# Patient Record
Sex: FEMALE | Race: WHITE | NOT HISPANIC OR LATINO | Employment: FULL TIME | URBAN - METROPOLITAN AREA
[De-identification: names, ages, dates, MRNs, and addresses within clinical notes are randomized per-mention and may not be internally consistent; named-entity substitution may affect disease eponyms.]

---

## 2017-12-01 ENCOUNTER — GENERIC CONVERSION - ENCOUNTER (OUTPATIENT)
Dept: OTHER | Facility: OTHER | Age: 42
End: 2017-12-01

## 2017-12-01 ENCOUNTER — ALLSCRIPTS OFFICE VISIT (OUTPATIENT)
Dept: OTHER | Facility: OTHER | Age: 42
End: 2017-12-01

## 2017-12-04 ENCOUNTER — GENERIC CONVERSION - ENCOUNTER (OUTPATIENT)
Dept: FAMILY MEDICINE CLINIC | Facility: CLINIC | Age: 42
End: 2017-12-04

## 2017-12-05 ENCOUNTER — LAB CONVERSION - ENCOUNTER (OUTPATIENT)
Dept: OTHER | Facility: OTHER | Age: 42
End: 2017-12-05

## 2017-12-05 LAB
ADDITIONAL INFORMATION (HISTORICAL): NORMAL
ADEQUACY: (HISTORICAL): NORMAL
COMMENT (HISTORICAL): NORMAL
CYTOTECHNOLOGIST: (HISTORICAL): NORMAL
INTERPRETATION (HISTORICAL): NORMAL
LMP (HISTORICAL): NORMAL
PREV. BX: (HISTORICAL): NORMAL
PREV. PAP (HISTORICAL): NORMAL
SOURCE (HISTORICAL): NORMAL

## 2018-01-22 VITALS
RESPIRATION RATE: 12 BRPM | BODY MASS INDEX: 25.39 KG/M2 | WEIGHT: 158 LBS | HEART RATE: 80 BPM | DIASTOLIC BLOOD PRESSURE: 80 MMHG | SYSTOLIC BLOOD PRESSURE: 124 MMHG | HEIGHT: 66 IN

## 2018-01-23 VITALS
SYSTOLIC BLOOD PRESSURE: 124 MMHG | RESPIRATION RATE: 12 BRPM | BODY MASS INDEX: 25.39 KG/M2 | DIASTOLIC BLOOD PRESSURE: 80 MMHG | HEART RATE: 80 BPM | WEIGHT: 158 LBS | HEIGHT: 66 IN

## 2018-01-23 NOTE — PROGRESS NOTES
Assessment    1  History of onychomycosis (V12 09) (Z86 19)   2  Breast cancer (174 9) (C50 919)   3  Pigmented skin lesions (709 00) (L81 9)   4  Encounter for annual routine gynecological examination (V72 31) (Z01 419)   5  Onychomycosis (110 1) (B35 1)    Plan  Encounter for routine gynecological examination    · (1) THIN PREP PAP WITH IMAGING; Status: In Progress - Specimen/Data Collected;    Done: [id number]  Maturation index required? : No  HPV? : if ASCUS  PMH: History of onychomycosis    · Escitalopram Oxalate 5 MG Oral Tablet; Take 1 tablet daily   · Terbinafine HCl - 250 MG Oral Tablet (LamISIL); TAKE 1 TABLET DAILY AS  DIRECTED   · (Q) HEPATIC FUNCT PANEL W/O TP; Status:Active; Requested for:[id number];    · (Q) HEPATIC FUNCT PANEL W/O TP; Status:Active; Requested TOBIN:[id number];         A/P  1/ routine gyn: we have done your pap   we will put a card in the mail with the results and you will be due back in 2 years    2  Multiple nevi: we have referred you to Dr Deya Olvera for surveillance    3  Breast CA: you are following with Dr Shawanda Pierre     4  fungal nails we will treat this with lamisil but please get your  blood work to check your liver first  we will call you with the results  then you will treat for 3 months with labs at 6 weeks to sally this  5   PMS: we have started you on lexapro    very low dose   take this once a day  please let me know if this is helping  '    we have updated your tdap today   rto prn     Chief Complaint  pt  presents in the office today due to being a new pt  and for a pap    rosalina - def  pap - 08/2016       History of Present Illness  HM, Adult Female: The patient is being seen for a gynecology evaluation  General Health:   Reproductive health:  she reports abnormal menses  Menstrual history:  age at menarche was 17  LMP: it was of a normal amount and duration and the duration was normal  Recent menstrual periods: bleeding has been normal  The cycles have been regular  The duration of her recent periods has been regular  Screening:   HPI: Here today a s a new pt for gyn   Has history of breast CA : first in 2002 and again in 2012  B/L mastectomy and breast reconstruction  Follows with Dr Raiford Shone and Tj Thorpe  IS due for follow up and will schedule those appts  has concern regarding fungal toe nails and does desire treatment  got biometric screenings done at work a couple months ago and these were normal  does complain of PMS and desires treatment ( gets emotional and sood with her cycles)         Review of Systems    Constitutional: No fever, no chills, feels well, no tiredness, no recent weight gain or weight loss  Cardiovascular: No complaints of slow heart rate, no fast heart rate, no chest pain, no palpitations, no leg claudication, no lower extremity edema  Respiratory: No complaints of shortness of breath, no wheezing, no cough, no SOB on exertion, no orthopnea, no PND  Gastrointestinal: No complaints of abdominal pain, no constipation, no nausea or vomiting, no diarrhea, no bloody stools  Genitourinary: as noted in HPI  Musculoskeletal: No complaints of arthralgias, no myalgias, no joint swelling or stiffness, no limb pain or swelling  Integumentary: No complaints of skin rash or lesions, no itching, no skin wounds, no breast pain or lump  Neurological: No complaints of headache, no confusion, no convulsions, no numbness, no dizziness or fainting, no tingling, no limb weakness, no difficulty walking  Psychiatric: as noted in HPI  Endocrine: No complaints of proptosis, no hot flashes, no muscle weakness, no deepening of the voice, no feelings of weakness  Hematologic/Lymphatic: No complaints of swollen glands, no swollen glands in the neck, does not bleed easily, does not bruise easily  Active Problems    1   Breast cancer (174 9) (I14 651)    Past Medical History    · History of Encounter for routine gynecological examination (D75 95) (Z01 419)   · History of onychomycosis (V12 09) (Z86 19)    Surgical History    · History of Breast Surgery Lumpectomy    Family History  Mother    · Family history of   Father    · Family history of transitional cell carcinoma of bladder (V16 52) (Z80 52)    Social History    · Always uses seat belt   · Has smoke detectors   ·    · Never a smoker   · No illicit drug use   · Occasional alcohol use    Current Meds   1  Multi-Vitamin TABS; Therapy: (Recorded:10Yqa6329) to Recorded    Allergies    1  Penicillins    Vitals   Recorded: 36SZF9296 09:03AM   Heart Rate 80   Respiration 12   Systolic 507   Diastolic 80   Height 5 ft 6 in   Weight 158 lb    BMI Calculated 25 5   BSA Calculated 1 81     Physical Exam    Constitutional   General appearance: No acute distress, well appearing and well nourished  Neck   Neck: Supple, symmetric, trachea midline, no masses  Pulmonary   Auscultation of lungs: Clear to auscultation  Cardiovascular   Auscultation of heart: Normal rate and rhythm, normal S1 and S2, no murmurs  Chest   Breasts: Normal, no dimpling or skin changes appreciated  Palpation of breasts and axillae: Normal, no masses palpated  Abdomen   Abdomen: Non-tender, no masses  Liver and spleen: No hepatomegaly or splenomegaly  Genitourinary   External genitalia and vagina: Normal, no lesions appreciated  Cervix: Normal, no lesions, Pap obtained  Examination of the cervix revealed normal findings  A Pap smear was performed  Uterus: Normal size, no tenderness, no masses  Adnexa/Parametria: Normal, no masses or tenderness  Lymphatic   Palpation of lymph nodes in neck: No lymphadenopathy  Musculoskeletal   Gait and station: Normal     Range of motion: Normal     Skin   Skin and subcutaneous tissue: Abnormal   multiple deeply pigment nevi varing in size from 2 mm to 8 mm on all body surfaces except face  R foot great toe with thickened mycotic nails     Psychiatric Orientation to person, place, and time: Normal   good eye contact and insight  good perspective and judgement  Mood and affect: Normal        Results/Data  PHQ-2 Adult Depression Screening [id number] 10:07AM User, Ahs     Test Name Result Flag Reference   PHQ-2 Adult Depression Score 0     Over the last two weeks, how often have you been bothered by any of the following problems?   Little interest or pleasure in doing things: Not at all - 0  Feeling down, depressed, or hopeless: Not at all - 0   PHQ-2 Adult Depression Screening Negative         Signatures   Electronically signed by : YONI Beckett; Dec  1 2017 10:46AM EST                       (Author)    Electronically signed by : Pato Tamayo DO; Dec  1 2017 12:50PM EST

## 2018-05-20 DIAGNOSIS — F32.A DEPRESSION, UNSPECIFIED DEPRESSION TYPE: Primary | ICD-10-CM

## 2018-05-21 RX ORDER — FLUOXETINE 10 MG/1
CAPSULE ORAL
Qty: 30 CAPSULE | Refills: 3 | Status: SHIPPED | OUTPATIENT
Start: 2018-05-21 | End: 2018-09-30 | Stop reason: SDUPTHER

## 2018-09-30 DIAGNOSIS — F32.A DEPRESSION, UNSPECIFIED DEPRESSION TYPE: ICD-10-CM

## 2018-10-01 RX ORDER — FLUOXETINE 10 MG/1
CAPSULE ORAL
Qty: 30 CAPSULE | Refills: 3 | Status: SHIPPED | OUTPATIENT
Start: 2018-10-01 | End: 2019-01-29 | Stop reason: SDUPTHER

## 2018-12-03 ENCOUNTER — OFFICE VISIT (OUTPATIENT)
Dept: FAMILY MEDICINE CLINIC | Facility: CLINIC | Age: 43
End: 2018-12-03
Payer: COMMERCIAL

## 2018-12-03 VITALS
HEIGHT: 65 IN | BODY MASS INDEX: 26.66 KG/M2 | DIASTOLIC BLOOD PRESSURE: 80 MMHG | WEIGHT: 160 LBS | SYSTOLIC BLOOD PRESSURE: 120 MMHG | HEART RATE: 70 BPM | RESPIRATION RATE: 12 BRPM

## 2018-12-03 DIAGNOSIS — Z13.220 SCREENING, LIPID: ICD-10-CM

## 2018-12-03 DIAGNOSIS — Z13.0 SCREENING, ANEMIA, DEFICIENCY, IRON: ICD-10-CM

## 2018-12-03 DIAGNOSIS — N92.4 EXCESSIVE BLEEDING IN PREMENOPAUSAL PERIOD: ICD-10-CM

## 2018-12-03 DIAGNOSIS — Z13.228 SCREENING FOR METABOLIC DISORDER: ICD-10-CM

## 2018-12-03 DIAGNOSIS — Z13.29 SCREENING FOR THYROID DISORDER: ICD-10-CM

## 2018-12-03 DIAGNOSIS — Z01.419 ENCOUNTER FOR ROUTINE GYNECOLOGICAL EXAMINATION WITH PAPANICOLAOU SMEAR OF CERVIX: Primary | ICD-10-CM

## 2018-12-03 PROBLEM — C50.919 BREAST CANCER (HCC): Status: ACTIVE | Noted: 2017-12-01

## 2018-12-03 PROCEDURE — 99396 PREV VISIT EST AGE 40-64: CPT | Performed by: NURSE PRACTITIONER

## 2018-12-03 RX ORDER — MULTIVITAMIN
1 TABLET ORAL DAILY
COMMUNITY

## 2018-12-03 NOTE — PROGRESS NOTES
Subjective    Glenna Patel is a 41 y o  female here for a routine gynecologic exam       Current complaints:   Heavy periods'      Gynecologic History  Patient's last menstrual period was 11/08/2018  Contraception: none  Last Pap:2016  Results were: normal  Last mammogram:defers due to B/L mastectomy      Obstetric History  OB History        The following portions of the patient's history were reviewed and updated as appropriate: allergies, current medications, past family history, past medical history, past social history, past surgical history and problem list     Review of Systems   Constitutional: Negative  Respiratory: Negative  Cardiovascular: Negative  Genitourinary: Positive for menstrual problem  Musculoskeletal: Negative  Skin: Negative  Neurological: Negative  Psychiatric/Behavioral: Negative  Objective    Vitals:    12/03/18 0732   BP: 120/80   BP Location: Left arm   Patient Position: Sitting   Pulse: 70   Resp: 12   Weight: 72 6 kg (160 lb)   Height: 5' 5" (1 651 m)       Physical Exam   Constitutional: She is oriented to person, place, and time  She appears well-developed and well-nourished  HENT:   Head: Normocephalic  Eyes: Pupils are equal, round, and reactive to light  Neck: Normal range of motion  Neck supple  Cardiovascular: Normal rate and regular rhythm  Pulmonary/Chest: Effort normal and breath sounds normal    Abdominal: Soft  Bowel sounds are normal    Genitourinary: Vagina normal  There is no rash, tenderness or lesion on the right labia  There is no rash, tenderness or lesion on the left labia  Cervix exhibits no discharge and no friability  Right adnexum displays no mass, no tenderness and no fullness  Left adnexum displays no mass, no tenderness and no fullness  Genitourinary Comments: Uterus was enlarged , firm on bimanual exam    Pap obtained  Musculoskeletal: Normal range of motion     Neurological: She is alert and oriented to person, place, and time  Skin: Skin is warm  Psychiatric: She has a normal mood and affect  Her behavior is normal  Judgment and thought content normal        Assessment     Healthy female exam       Plan  1  Menorrhagia  : we will obtain an ultrasound of the pelvis and we will call you with the results  We have also ordered labs and we will be in touch with those results as well  2  Pap was obtained and we will put a  Card it the mail with the results       rto prn

## 2018-12-05 LAB
CLINICAL INFO: NORMAL
DATE PREVIOUS BX: NORMAL
LMP START DATE: NORMAL
SL AMB PREV. PAP:: NORMAL
SPECIMEN SOURCE CVX/VAG CYTO: NORMAL

## 2018-12-06 ENCOUNTER — OFFICE VISIT (OUTPATIENT)
Dept: HEMATOLOGY ONCOLOGY | Facility: CLINIC | Age: 43
End: 2018-12-06
Payer: COMMERCIAL

## 2018-12-06 VITALS
RESPIRATION RATE: 16 BRPM | BODY MASS INDEX: 26.66 KG/M2 | SYSTOLIC BLOOD PRESSURE: 128 MMHG | HEIGHT: 65 IN | TEMPERATURE: 98.6 F | OXYGEN SATURATION: 97 % | HEART RATE: 96 BPM | WEIGHT: 160 LBS | DIASTOLIC BLOOD PRESSURE: 90 MMHG

## 2018-12-06 DIAGNOSIS — C50.912 MALIGNANT NEOPLASM OF BOTH BREASTS IN FEMALE, ESTROGEN RECEPTOR NEGATIVE, UNSPECIFIED SITE OF BREAST (HCC): Primary | ICD-10-CM

## 2018-12-06 DIAGNOSIS — Z15.01 BRCA1 GENE MUTATION POSITIVE: ICD-10-CM

## 2018-12-06 DIAGNOSIS — C50.911 MALIGNANT NEOPLASM OF BOTH BREASTS IN FEMALE, ESTROGEN RECEPTOR NEGATIVE, UNSPECIFIED SITE OF BREAST (HCC): Primary | ICD-10-CM

## 2018-12-06 DIAGNOSIS — Z15.09 BRCA1 GENE MUTATION POSITIVE: ICD-10-CM

## 2018-12-06 DIAGNOSIS — Z17.1 MALIGNANT NEOPLASM OF BOTH BREASTS IN FEMALE, ESTROGEN RECEPTOR NEGATIVE, UNSPECIFIED SITE OF BREAST (HCC): Primary | ICD-10-CM

## 2018-12-06 PROCEDURE — 99204 OFFICE O/P NEW MOD 45 MIN: CPT | Performed by: INTERNAL MEDICINE

## 2018-12-06 NOTE — PROGRESS NOTES
Hematology / Oncology Outpatient Consult Note    Brittany Ybarra 41 y o  female [id number]         Date:  12/6/2018    Assessment / Plan:  A 79-year-old premenopausal woman who has history of stage IIB right breast cancer, triple negative disease diagnosed in 2003 as well as stage IA left breast cancer, grade 3, triple negative disease, diagnosed in 2010  She is status post bilateral mastectomy  She was treated with adjuvant chemotherapy further respected time of breast cancer  Clinically, she has no evidence recurrent disease  We discussed prophylactic oophorectomy since she has BRCA 1 mutation  She has no family history of breast or ovarian cancer  I recommended her to see our gynecologic oncologist to discuss procedure of bilateral oophorectomy  She is in agreement with my recommendations  I will see her again in a year for routine follow-up  Subjective:     HPI:  A 79-year-old premenopausal woman who was diagnosed with stage IIB right breast cancer, triple negative disease in 2003  She underwent lumpectomy followed by adjuvant chemotherapy with AC followed by paclitaxel  She was then diagnosed with stage IA left breast cancer, triple negative disease in 2010  She was found to have BRCA-1 mutation  Therefore, she underwent left mastectomy as well as right prophylactic mastectomy, resulting in [place] followed by adjuvant chemotherapy with Taxotere and cyclophosphamide x4 cycles  She is currently on observation  She was last seen by me in July 2011  She presents today for follow-up  She feels well  She has no complaint of pain  Her weight is stable  She has no respiratory symptoms  She has no significant past medical history  She has not had prophylactic oophorectomy yet  She has 6years old boy  She has no intention to have another child  Her performance status is normal         Interval History:          Objective:     Primary Diagnosis:    1     Right breast cancer, stage IIB (pT2, pN1, M0) ER IN negative, HER2 negative disease  Diagnosed in 2003  2    Left breast cancer, stage IA (pT1c, pN0, M0) grade 3, ERPR negative, HER2 negative disease  Diagnosed in June 2010  3  BRCA-1 mutation  Cancer Staging:  Cancer Staging  No matching staging information was found for the patient  Previous Hematologic/ Oncologic Treatment:     1  Adjuvant chemotherapy with AC followed by paclitaxel in 2003  2    Adjuvant chemotherapy with Taxotere and cyclophosphamide x4 cycle in 2010  Current Hematologic/ Oncologic Treatment:      Observation  Disease Status:     INDIA status post bilateral mastectomy  Test Results:    Pathology:        Radiology:        Laboratory:    See below  Physical Exam:      General Appearance:    Alert, oriented        Eyes:    PERRL   Ears:    Normal external ear canals, both ears   Nose:   Nares normal, septum midline   Throat:   Mucosa moist  Pharynx without injection  Neck:   Supple       Lungs:     Clear to auscultation bilaterally   Chest Wall:    No tenderness or deformity    Heart:    Regular rate and rhythm       Abdomen:     Soft, non-tender, bowel sounds +, no organomegaly           Extremities:   Extremities no cyanosis or edema       Skin:   no rash or icterus  Lymph nodes:   Cervical, supraclavicular, and axillary nodes normal   Neurologic:   CNII-XII intact, normal strength, sensation and reflexes     Throughout          Breast exam:   Status post bilateral mastectomy with reconstruction  No palpable abnormality in either reconstructed breast or chest wall  ROS: Review of Systems   All other systems reviewed and are negative  Imaging: No results found        Labs: No results found for: WBC, HGB, HCT, MCV, PLT  No results found for: NA, K, CL, CO2, ANIONGAP, BUN, CREATININE, GLUCOSE, GLUF, CALCIUM, CORRECTEDCA, AST, ALT, ALKPHOS, PROT, BILITOT, EGFR        Vital Sign:    Body surface area is 1 8 meters squared  Wt Readings from Last 3 Encounters:   12/06/18 72 6 kg (160 lb)   12/03/18 72 6 kg (160 lb)   12/01/17 71 7 kg (158 lb)        Temp Readings from Last 3 Encounters:   12/06/18 98 6 °F (37 °C) (Tympanic)        BP Readings from Last 3 Encounters:   12/06/18 128/90   12/03/18 120/80   12/01/17 124/80         Pulse Readings from Last 3 Encounters:   12/06/18 96   12/03/18 70   12/01/17 80     @LASTSAO2(3)@    Active Problems:   Patient Active Problem List   Diagnosis    Breast cancer ([facility] [address] )    BRCA1 gene mutation positive       Past Medical History: History reviewed  No pertinent past medical history  Surgical History:   Past Surgical History:   Procedure Laterality Date    BREAST SURGERY      Lumpectomy       Family History:    Family History   Problem Relation Age of Onset    Colon cancer Mother     Other Father        Cancer-related family history includes Colon cancer in her mother  Social History:   Social History     Social History    Marital status: /Civil Union     Spouse name: N/A    Number of children: N/A    Years of education: N/A     Occupational History    Not on file  Social History Main Topics    Smoking status: Never Smoker    Smokeless tobacco: Never Used    Alcohol use Yes      Comment: Occasional     Drug use: No    Sexual activity: Not on file     Other Topics Concern    Not on file     Social History Narrative    Always uses seat belt    Has smoke detectors           Current Medications:   Current Outpatient Prescriptions   Medication Sig Dispense Refill    FLUoxetine (PROzac) 10 mg capsule TAKE ONE CAPSULE EVERY DAY BEFORE NOON 30 capsule 3    Multiple Vitamin (MULTIVITAMIN) tablet Take 1 tablet by mouth daily       No current facility-administered medications for this visit  Allergies:    Allergies   Allergen Reactions    Penicillins Hives

## 2019-01-29 DIAGNOSIS — F32.A DEPRESSION, UNSPECIFIED DEPRESSION TYPE: ICD-10-CM

## 2019-01-30 RX ORDER — FLUOXETINE 10 MG/1
CAPSULE ORAL
Qty: 30 CAPSULE | Refills: 3 | Status: SHIPPED | OUTPATIENT
Start: 2019-01-30 | End: 2019-06-10 | Stop reason: SDUPTHER

## 2019-06-10 DIAGNOSIS — F32.A DEPRESSION, UNSPECIFIED DEPRESSION TYPE: ICD-10-CM

## 2019-06-10 RX ORDER — FLUOXETINE 10 MG/1
CAPSULE ORAL
Qty: 30 CAPSULE | Refills: 3 | Status: SHIPPED | OUTPATIENT
Start: 2019-06-10 | End: 2019-10-15 | Stop reason: SDUPTHER

## 2019-10-15 DIAGNOSIS — F32.A DEPRESSION, UNSPECIFIED DEPRESSION TYPE: ICD-10-CM

## 2019-10-15 RX ORDER — FLUOXETINE 10 MG/1
CAPSULE ORAL
Qty: 30 CAPSULE | Refills: 3 | Status: SHIPPED | OUTPATIENT
Start: 2019-10-15 | End: 2020-02-27

## 2019-12-06 ENCOUNTER — TELEPHONE (OUTPATIENT)
Dept: HEMATOLOGY ONCOLOGY | Facility: CLINIC | Age: 44
End: 2019-12-06

## 2019-12-06 ENCOUNTER — OFFICE VISIT (OUTPATIENT)
Dept: HEMATOLOGY ONCOLOGY | Facility: CLINIC | Age: 44
End: 2019-12-06
Payer: COMMERCIAL

## 2019-12-06 VITALS
WEIGHT: 168 LBS | BODY MASS INDEX: 27.99 KG/M2 | TEMPERATURE: 97.7 F | DIASTOLIC BLOOD PRESSURE: 72 MMHG | RESPIRATION RATE: 18 BRPM | HEART RATE: 86 BPM | OXYGEN SATURATION: 97 % | SYSTOLIC BLOOD PRESSURE: 118 MMHG | HEIGHT: 65 IN

## 2019-12-06 DIAGNOSIS — Z17.1 MALIGNANT NEOPLASM OF BOTH BREASTS IN FEMALE, ESTROGEN RECEPTOR NEGATIVE, UNSPECIFIED SITE OF BREAST (HCC): Primary | ICD-10-CM

## 2019-12-06 DIAGNOSIS — C50.911 MALIGNANT NEOPLASM OF BOTH BREASTS IN FEMALE, ESTROGEN RECEPTOR NEGATIVE, UNSPECIFIED SITE OF BREAST (HCC): Primary | ICD-10-CM

## 2019-12-06 DIAGNOSIS — C50.912 MALIGNANT NEOPLASM OF BOTH BREASTS IN FEMALE, ESTROGEN RECEPTOR NEGATIVE, UNSPECIFIED SITE OF BREAST (HCC): Primary | ICD-10-CM

## 2019-12-06 PROCEDURE — 99214 OFFICE O/P EST MOD 30 MIN: CPT | Performed by: INTERNAL MEDICINE

## 2019-12-06 NOTE — TELEPHONE ENCOUNTER
New Patient Encounter    New Patient Intake Form   Patient Details:  Caroline Grajeda  1975  [id number]    Background Information:  [address] starts by opening a telephone encounter and gathering the following information   Who is calling to schedule? If not self, relationship to patient? Ngoc Flores   Referring Provider Dr Avis Horowitz   What is the diagnosis? Hysterectomy   When was the diagnosis? 2019   Is patient aware of diagnosis? Yes   Reason for visit? Second Opinion   Have you had any testing done? If so: when, where? Yes   Are records in [facility]? yes   Was the patient told to bring a disk? no   Scheduling Information:   Preferred [facility]     Requesting Specific Provider? Dr Le Juárez   Are there any dates/time the patient cannot be seen? Would like morning appointment      Miscellaneous: n/a   After completing the above information, please route to Financial Counselor and the appropriate Nurse Navigator for review

## 2019-12-06 NOTE — PROGRESS NOTES
Hematology / Oncology Outpatient Follow Up Note    Gerald Cardoso 45 y o  female :1975 [id number]         Date:  2019    Assessment / Plan:  A 57-year-old premenopausal woman who has history of stage IIB right breast cancer, triple negative disease diagnosed in  as well as stage IA left breast cancer, grade 3, triple negative disease, diagnosed in   She is status post bilateral mastectomy  She was treated with adjuvant chemotherapy for each time  She has BRCA -1 mutation  Based on her symptomatology and physical examinations, she has no evidence recurrent disease  I recommended her to see gynecologic oncologist regarding risk reducing bilateral oophorectomy  She is in agreement with my recommendation  I will see her again in a year for routine follow-up              Subjective:      HPI:  A 51-year-old premenopausal woman who was diagnosed with stage IIB right breast cancer, triple negative disease in   She underwent lumpectomy followed by adjuvant chemotherapy with AC followed by paclitaxel  She was then diagnosed with stage IA left breast cancer, triple negative disease in   She was found to have BRCA-1 mutation  Therefore, she underwent left mastectomy as well as right prophylactic mastectomy, resulting in [place] followed by adjuvant chemotherapy with Taxotere and cyclophosphamide x4 cycles  She is currently on observation  She was last seen by me in 2011  She presents today for follow-up  She feels well  She has no complaint of pain  Her weight is stable  She has no respiratory symptoms  She has no significant past medical history  She has not had prophylactic oophorectomy yet  She has 6years old boy  She has no intention to have another child    Her performance status is normal            Interval History:  A 57-year-old premenopausal woman who has history of stage IIB right breast cancer, triple negative disease diagnosed in  as well as stage IA left breast cancer, grade 3, triple negative disease, diagnosed in 2010  She is status post bilateral mastectomy  She was treated with adjuvant chemotherapy for each time  She has BRCA -1 mutation  She has not had prophylactic oophorectomy  Last year, I referred her to gynecologic oncologist   However, she has not seen by them  She has regular menstrual cycle  She has no new complaint  She has no hot flashes  She denied any pain  Her weight is stable  She has no respiratory symptoms  Her performance status is normal            Objective:      Primary Diagnosis:     1  Right breast cancer, stage IIB (pT2, pN1, M0) ER MD negative, HER2 negative disease  Diagnosed in 2003  2    Left breast cancer, stage IA (pT1c, pN0, M0) grade 3, ERPR negative, HER2 negative disease  Diagnosed in June 2010  3  BRCA-1 mutation      Cancer Staging:  Cancer Staging  No matching staging information was found for the patient         Previous Hematologic/ Oncologic Treatment:      1  Adjuvant chemotherapy with AC followed by paclitaxel in 2003  2    Adjuvant chemotherapy with Taxotere and cyclophosphamide x4 cycle in 2010      Current Hematologic/ Oncologic Treatment:       Observation      Disease Status:      INDIA status post bilateral mastectomy      Test Results:     Pathology:           Radiology:           Laboratory:     See below      Physical Exam:        General Appearance:    Alert, oriented          Eyes:    PERRL   Ears:    Normal external ear canals, both ears   Nose:   Nares normal, septum midline   Throat:   Mucosa moist  Pharynx without injection  Neck:   Supple         Lungs:     Clear to auscultation bilaterally   Chest Wall:    No tenderness or deformity    Heart:    Regular rate and rhythm         Abdomen:     Soft, non-tender, bowel sounds +, no organomegaly               Extremities:   Extremities no cyanosis or edema         Skin:   no rash or icterus      Lymph nodes:   Cervical, supraclavicular, and axillary nodes normal   Neurologic:   CNII-XII intact, normal strength, sensation and reflexes     Throughout             Breast exam:   Status post bilateral mastectomy with reconstruction  No palpable abnormality in either reconstructed breast or chest wall  ROS: Review of Systems   All other systems reviewed and are negative  Imaging: No results found  Labs: No results found for: WBC, HGB, HCT, MCV, PLT  No results found for: NA, K, CL, CO2, ANIONGAP, BUN, CREATININE, GLUCOSE, GLUF, CALCIUM, CORRECTEDCA, AST, ALT, ALKPHOS, PROT, BILITOT, EGFR        Current Medications: Reviewed  Allergies: Reviewed  PMH/FH/SH:  Reviewed      Vital Sign:    Body surface area is 1 84 meters squared      Wt Readings from Last 3 Encounters:   12/06/19 76 2 kg (168 lb)   12/06/18 72 6 kg (160 lb)   12/03/18 72 6 kg (160 lb)        Temp Readings from Last 3 Encounters:   12/06/19 97 7 °F (36 5 °C) (Tympanic Core)   12/06/18 98 6 °F (37 °C) (Tympanic)        BP Readings from Last 3 Encounters:   12/06/19 118/72   12/06/18 128/90   12/03/18 120/80         Pulse Readings from Last 3 Encounters:   12/06/19 86   12/06/18 96   12/03/18 70     @LASTSAO2(3)@

## 2020-01-30 ENCOUNTER — CONSULT (OUTPATIENT)
Dept: GYNECOLOGIC ONCOLOGY | Facility: CLINIC | Age: 45
End: 2020-01-30
Payer: COMMERCIAL

## 2020-01-30 VITALS
SYSTOLIC BLOOD PRESSURE: 130 MMHG | HEIGHT: 65 IN | TEMPERATURE: 98.7 F | BODY MASS INDEX: 27.99 KG/M2 | DIASTOLIC BLOOD PRESSURE: 80 MMHG | HEART RATE: 80 BPM | WEIGHT: 168 LBS

## 2020-01-30 DIAGNOSIS — C50.912 MALIGNANT NEOPLASM OF BOTH BREASTS IN FEMALE, ESTROGEN RECEPTOR NEGATIVE, UNSPECIFIED SITE OF BREAST (HCC): ICD-10-CM

## 2020-01-30 DIAGNOSIS — Z15.01 BRCA1 GENE MUTATION POSITIVE: Primary | ICD-10-CM

## 2020-01-30 DIAGNOSIS — D21.9 FIBROIDS: ICD-10-CM

## 2020-01-30 DIAGNOSIS — N93.9 ABNORMAL UTERINE BLEEDING (AUB): ICD-10-CM

## 2020-01-30 DIAGNOSIS — C50.911 MALIGNANT NEOPLASM OF BOTH BREASTS IN FEMALE, ESTROGEN RECEPTOR NEGATIVE, UNSPECIFIED SITE OF BREAST (HCC): ICD-10-CM

## 2020-01-30 DIAGNOSIS — Z15.09 BRCA1 GENE MUTATION POSITIVE: Primary | ICD-10-CM

## 2020-01-30 DIAGNOSIS — Z17.1 MALIGNANT NEOPLASM OF BOTH BREASTS IN FEMALE, ESTROGEN RECEPTOR NEGATIVE, UNSPECIFIED SITE OF BREAST (HCC): ICD-10-CM

## 2020-01-30 PROCEDURE — 99205 OFFICE O/P NEW HI 60 MIN: CPT | Performed by: OBSTETRICS & GYNECOLOGY

## 2020-01-30 RX ORDER — ACETAMINOPHEN 325 MG/1
975 TABLET ORAL ONCE
Status: CANCELLED | OUTPATIENT
Start: 2020-01-30 | End: 2020-01-30

## 2020-01-30 RX ORDER — HEPARIN SODIUM 5000 [USP'U]/ML
5000 INJECTION, SOLUTION INTRAVENOUS; SUBCUTANEOUS
Status: CANCELLED | OUTPATIENT
Start: 2020-01-30 | End: 2020-01-31

## 2020-01-30 RX ORDER — SODIUM CHLORIDE, SODIUM LACTATE, POTASSIUM CHLORIDE, CALCIUM CHLORIDE 600; 310; 30; 20 MG/100ML; MG/100ML; MG/100ML; MG/100ML
125 INJECTION, SOLUTION INTRAVENOUS CONTINUOUS
Status: CANCELLED | OUTPATIENT
Start: 2020-01-30

## 2020-01-30 NOTE — LETTER
January 30, 2020     David Desouza, [address]  [address] [facility] [address]    Patient: Jorge A Alas   YOB: 1975   Date of Visit: 1/30/2020       Dear Raman Savage and Suzanne Nonoan:    Thank you for referring Cuauhtemoc Keen to me for evaluation  Below are the relevant portions of my H&P  If you have questions, please do not hesitate to call me  I look forward to following Glenna along with you  Sincerely,        Narciso Molina MD        CC: MD Narciso Hernandez MD  1/30/2020  9:17 AM  Signed  Assessment/Plan:    Problem List Items Addressed This Visit        Genitourinary    Abnormal uterine bleeding (AUB)       Other    Breast cancer ([facility] [address] )    BRCA1 gene mutation positive - Primary     51-year-old para 1 who does not desire future fertility with a history of bilateral triple negative breast cancer status post bilateral mastectomies last treated in 2011  She has a BRCA 1 mutation  She has a 15 week size fibroid uterus, abnormal uterine bleeding  Performance status is 0   1  I discussed the rationale behind risk reducing salpingo-oophorectomy  I also discussed the rationale behind hysterectomy due to her abnormal uterine bleeding and fibroids  2  I discussed the risks and benefits of possible robotic assisted total laparoscopic hysterectomy, bilateral salpingo-oophorectomy, possible exploratory laparotomy with ovarian cancer staging including omentectomy, peritoneal biopsies, lymphadenectomy in the event that malignancy is identified the time of surgery  She understands the risks and benefits of the operation agrees to proceed as outlined  Consent was obtained by me in the office  3  She understands that there is a small chance of malignancy that will be identified the time of surgery as well as a small chance of occult malignancy that may require a 2nd procedure for staging    4  She understands that if there is no malignancy identified, there are is no Neurological: Negative  Hematological: Negative  Psychiatric/Behavioral: Negative  Current Outpatient Medications   Medication Sig Dispense Refill    FLUoxetine (PROzac) 10 mg capsule TAKE ONE CAPSULE EVERY DAY BEFORE NOON 30 capsule 3    Multiple Vitamin (MULTIVITAMIN) tablet Take 1 tablet by mouth daily       No current facility-administered medications for this visit  Allergies   Allergen Reactions    Penicillins Hives       History reviewed  No pertinent past medical history  Past Surgical History:   Procedure Laterality Date    BREAST SURGERY      Lumpectomy       OB History        1    Para   1    Term                AB        Living           SAB        TAB        Ectopic        Multiple        Live Births                     Family History   Problem Relation Age of Onset    Colon cancer Mother     Other Father        The following portions of the patient's history were reviewed and updated as appropriate: allergies, current medications, past family history, past medical history, past social history, past surgical history and problem list       Objective:    Blood pressure 130/80, pulse 80, temperature 98 7 °F (37 1 °C), temperature source Tympanic, height 5' 5" (1 651 m), weight 76 2 kg (168 lb)  Body mass index is 27 96 kg/m²  Physical Exam   Constitutional: She is oriented to person, place, and time  She appears well-developed and well-nourished  No distress  HENT:   Head: Normocephalic and atraumatic  Neck: Normal range of motion  Neck supple  No thyromegaly present  Cardiovascular: Normal rate, regular rhythm and normal heart sounds  Pulmonary/Chest: Effort normal and breath sounds normal    Abdominal: Soft  She exhibits no distension and no mass  There is no tenderness  There is no rebound and no guarding  Genitourinary:   Genitourinary Comments:  The external female genitalia is normal  The bartholin's, uretheral and skenes glands are normal  The urethral meatus is normal (midline with no lesions)  Anus without fissure or lesion  Speculum exam reveals vagina without lesion or discharge  Cervix is normal appearing without visible lesions  There is a small amount of blood present in the posterior fornix  No significant cystocele or rectocele noted  Bimanual exam notes a uterus measuring approximately 15 week size with palpable fibroids  It is globular  It is mobile  No tenderness  Adnexa without masses or tenderness  Bladder is without fullness, mass or tenderness  Musculoskeletal: She exhibits no edema  Lymphadenopathy:     She has no cervical adenopathy  Neurological: She is alert and oriented to person, place, and time  Skin: Skin is warm and dry  She is not diaphoretic  Psychiatric: She has a normal mood and affect   Her behavior is normal  Judgment and thought content normal

## 2020-01-30 NOTE — H&P
Assessment/Plan:    Problem List Items Addressed This Visit        Genitourinary    Abnormal uterine bleeding (AUB)       Other    Breast cancer ([facility] [address] )    BRCA1 gene mutation positive - Primary     53-year-old para 1 who does not desire future fertility with a history of bilateral triple negative breast cancer status post bilateral mastectomies last treated in 2011  She has a BRCA 1 mutation  She has a 15 week size fibroid uterus, abnormal uterine bleeding  Performance status is 0   1  I discussed the rationale behind risk reducing salpingo-oophorectomy  I also discussed the rationale behind hysterectomy due to her abnormal uterine bleeding and fibroids  2  I discussed the risks and benefits of possible robotic assisted total laparoscopic hysterectomy, bilateral salpingo-oophorectomy, possible exploratory laparotomy with ovarian cancer staging including omentectomy, peritoneal biopsies, lymphadenectomy in the event that malignancy is identified the time of surgery  She understands the risks and benefits of the operation agrees to proceed as outlined  Consent was obtained by me in the office  3  She understands that there is a small chance of malignancy that will be identified the time of surgery as well as a small chance of occult malignancy that may require a 2nd procedure for staging  4  She understands that if there is no malignancy identified, there are is no prescribe screening for potential primary peritoneal carcinoma with lifetime risk of approximately 1 -5%  5  Given that she has a history of triple negative breast cancer treated with bilateral mastectomy, she may be a candidate for postoperative hormone replacement therapy with estrogen  This will be discussed with her medical oncologist   Thank you for the courtesy of this consultation  All questions were answered by the end of the visit             Relevant Orders    Type and screen    Comprehensive metabolic panel    CBC and differential APTT    Protime-INR    HEMOGLOBIN A1C W/ EAG ESTIMATION    EKG 12 lead    XR chest pa & lateral    US pelvis complete non OB    Case request operating room: HYSTERECTOMY LAPAROSCOPIC TOTAL ([address]) W/ ROBOTICS (Completed)    Fibroids              CHIEF COMPLAINT:  BRCA 1 mutation carrier, history of bilateral triple negative breast cancer, abnormal uterine bleeding, fibroids          Patient ID: Davis Padilla is a 45 y o  female  29-year-old para 1 who does not desire future fertility with a medical history significant for bilateral triple negative breast cancer diagnosed in 2003 and again in 2010  She is now status post bilateral mastectomy, adjuvant chemotherapy has been without evidence of disease since 2010  She has a mutation in BRCA 1, she also has heavy menstrual periods  She does not have pelvic pain  She is referred as a consultation from Dr Cabrera Shearer to discuss risk reducing surgery for her BRCA 1 mutation  Review of Systems   Constitutional: Negative for activity change and unexpected weight change  HENT: Negative  Eyes: Negative  Respiratory: Negative  Cardiovascular: Negative  Gastrointestinal: Negative for abdominal distention and abdominal pain  Endocrine: Negative  Genitourinary: Positive for vaginal bleeding  Negative for pelvic pain  Musculoskeletal: Negative  Skin: Negative  Allergic/Immunologic: Negative  Neurological: Negative  Hematological: Negative  Psychiatric/Behavioral: Negative  Current Outpatient Medications   Medication Sig Dispense Refill    FLUoxetine (PROzac) 10 mg capsule TAKE ONE CAPSULE EVERY DAY BEFORE NOON 30 capsule 3    Multiple Vitamin (MULTIVITAMIN) tablet Take 1 tablet by mouth daily       No current facility-administered medications for this visit  Allergies   Allergen Reactions    Penicillins Hives       History reviewed  No pertinent past medical history      Past Surgical History:   Procedure Laterality Date    BREAST SURGERY      Lumpectomy       OB History        1    Para   1    Term                AB        Living           SAB        TAB        Ectopic        Multiple        Live Births                     Family History   Problem Relation Age of Onset    Colon cancer Mother     Other Father        The following portions of the patient's history were reviewed and updated as appropriate: allergies, current medications, past family history, past medical history, past social history, past surgical history and problem list       Objective:    Blood pressure 130/80, pulse 80, temperature 98 7 °F (37 1 °C), temperature source Tympanic, height 5' 5" (1 651 m), weight 76 2 kg (168 lb)  Body mass index is 27 96 kg/m²  Physical Exam   Constitutional: She is oriented to person, place, and time  She appears well-developed and well-nourished  No distress  HENT:   Head: Normocephalic and atraumatic  Neck: Normal range of motion  Neck supple  No thyromegaly present  Cardiovascular: Normal rate, regular rhythm and normal heart sounds  Pulmonary/Chest: Effort normal and breath sounds normal    Abdominal: Soft  She exhibits no distension and no mass  There is no tenderness  There is no rebound and no guarding  Genitourinary:   Genitourinary Comments: The external female genitalia is normal  The bartholin's, uretheral and skenes glands are normal  The urethral meatus is normal (midline with no lesions)  Anus without fissure or lesion  Speculum exam reveals vagina without lesion or discharge  Cervix is normal appearing without visible lesions  There is a small amount of blood present in the posterior fornix  No significant cystocele or rectocele noted  Bimanual exam notes a uterus measuring approximately 15 week size with palpable fibroids  It is globular  It is mobile  No tenderness  Adnexa without masses or tenderness  Bladder is without fullness, mass or tenderness       Musculoskeletal: She exhibits no edema    Lymphadenopathy:     She has no cervical adenopathy  Neurological: She is alert and oriented to person, place, and time  Skin: Skin is warm and dry  She is not diaphoretic  Psychiatric: She has a normal mood and affect   Her behavior is normal  Judgment and thought content normal

## 2020-01-30 NOTE — ASSESSMENT & PLAN NOTE
60-year-old para 1 who does not desire future fertility with a history of bilateral triple negative breast cancer status post bilateral mastectomies last treated in 2011  She has a BRCA 1 mutation  She has a 15 week size fibroid uterus, abnormal uterine bleeding  Performance status is 0   1  I discussed the rationale behind risk reducing salpingo-oophorectomy  I also discussed the rationale behind hysterectomy due to her abnormal uterine bleeding and fibroids  2  I discussed the risks and benefits of possible robotic assisted total laparoscopic hysterectomy, bilateral salpingo-oophorectomy, possible exploratory laparotomy with ovarian cancer staging including omentectomy, peritoneal biopsies, lymphadenectomy in the event that malignancy is identified the time of surgery  She understands the risks and benefits of the operation agrees to proceed as outlined  Consent was obtained by me in the office  3  She understands that there is a small chance of malignancy that will be identified the time of surgery as well as a small chance of occult malignancy that may require a 2nd procedure for staging  4  She understands that if there is no malignancy identified, there are is no prescribe screening for potential primary peritoneal carcinoma with lifetime risk of approximately 1 -5%  5  Given that she has a history of triple negative breast cancer treated with bilateral mastectomy, she may be a candidate for postoperative hormone replacement therapy with estrogen  This will be discussed with her medical oncologist   Thank you for the courtesy of this consultation  All questions were answered by the end of the visit

## 2020-02-27 DIAGNOSIS — F32.A DEPRESSION, UNSPECIFIED DEPRESSION TYPE: ICD-10-CM

## 2020-02-27 RX ORDER — FLUOXETINE 10 MG/1
CAPSULE ORAL
Qty: 30 CAPSULE | Refills: 3 | Status: SHIPPED | OUTPATIENT
Start: 2020-02-27 | End: 2020-06-27

## 2020-06-18 ENCOUNTER — TELEPHONE (OUTPATIENT)
Dept: GYNECOLOGIC ONCOLOGY | Facility: CLINIC | Age: 45
End: 2020-06-18

## 2020-06-23 ENCOUNTER — TELEPHONE (OUTPATIENT)
Dept: GYNECOLOGIC ONCOLOGY | Facility: CLINIC | Age: 45
End: 2020-06-23

## 2020-06-25 ENCOUNTER — TELEMEDICINE (OUTPATIENT)
Dept: GYNECOLOGIC ONCOLOGY | Facility: CLINIC | Age: 45
End: 2020-06-25
Payer: COMMERCIAL

## 2020-06-25 DIAGNOSIS — Z15.09 BRCA1 GENE MUTATION POSITIVE: Primary | ICD-10-CM

## 2020-06-25 DIAGNOSIS — N93.9 ABNORMAL UTERINE BLEEDING (AUB): ICD-10-CM

## 2020-06-25 DIAGNOSIS — Z15.01 BRCA1 GENE MUTATION POSITIVE: Primary | ICD-10-CM

## 2020-06-25 PROCEDURE — 1036F TOBACCO NON-USER: CPT | Performed by: OBSTETRICS & GYNECOLOGY

## 2020-06-25 PROCEDURE — 99213 OFFICE O/P EST LOW 20 MIN: CPT | Performed by: OBSTETRICS & GYNECOLOGY

## 2020-06-27 DIAGNOSIS — F32.A DEPRESSION, UNSPECIFIED DEPRESSION TYPE: ICD-10-CM

## 2020-06-27 RX ORDER — FLUOXETINE 10 MG/1
CAPSULE ORAL
Qty: 30 CAPSULE | Refills: 3 | Status: SHIPPED | OUTPATIENT
Start: 2020-06-27 | End: 2020-11-17

## 2020-07-13 ENCOUNTER — HOSPITAL ENCOUNTER (OUTPATIENT)
Dept: ULTRASOUND IMAGING | Facility: HOSPITAL | Age: 45
Discharge: HOME | End: 2020-07-13
Attending: OBSTETRICS & GYNECOLOGY
Payer: COMMERCIAL

## 2020-07-13 ENCOUNTER — TRANSCRIBE ORDERS (OUTPATIENT)
Dept: LAB | Facility: CLINIC | Age: 45
End: 2020-07-13

## 2020-07-13 ENCOUNTER — LAB (OUTPATIENT)
Dept: LAB | Facility: CLINIC | Age: 45
End: 2020-07-13
Payer: COMMERCIAL

## 2020-07-13 ENCOUNTER — HOSPITAL ENCOUNTER (OUTPATIENT)
Dept: RADIOLOGY | Facility: HOSPITAL | Age: 45
Discharge: HOME | End: 2020-07-13
Attending: OBSTETRICS & GYNECOLOGY
Payer: COMMERCIAL

## 2020-07-13 DIAGNOSIS — Z15.01 BRCA1 GENE MUTATION POSITIVE: ICD-10-CM

## 2020-07-13 DIAGNOSIS — Z15.09 BRCA1 GENE MUTATION POSITIVE: ICD-10-CM

## 2020-07-13 DIAGNOSIS — Z15.09 GENETIC SUSCEPTIBILITY TO OTHER MALIGNANT NEOPLASM: ICD-10-CM

## 2020-07-13 DIAGNOSIS — Z15.01 GENETIC SUSCEPTIBILITY TO MALIGNANT NEOPLASM OF BREAST: Primary | ICD-10-CM

## 2020-07-13 DIAGNOSIS — Z15.01 GENETIC SUSCEPTIBILITY TO MALIGNANT NEOPLASM OF BREAST: ICD-10-CM

## 2020-07-13 LAB
ABO GROUP BLD: NORMAL
ALBUMIN SERPL BCP-MCNC: 3.3 G/DL (ref 3.5–5)
ALP SERPL-CCNC: 86 U/L (ref 46–116)
ALT SERPL W P-5'-P-CCNC: 50 U/L (ref 12–78)
ANION GAP SERPL CALCULATED.3IONS-SCNC: 7 MMOL/L (ref 4–13)
APTT PPP: 29 SECONDS (ref 23–37)
AST SERPL W P-5'-P-CCNC: 38 U/L (ref 5–45)
ATRIAL RATE: 68 BPM
BASOPHILS # BLD AUTO: 0.01 THOUSANDS/ΜL (ref 0–0.1)
BASOPHILS NFR BLD AUTO: 0 % (ref 0–1)
BILIRUB SERPL-MCNC: 0.37 MG/DL (ref 0.2–1)
BLD GP AB SCN SERPL QL: NEGATIVE
BUN SERPL-MCNC: 7 MG/DL (ref 5–25)
CALCIUM SERPL-MCNC: 8.7 MG/DL (ref 8.3–10.1)
CHLORIDE SERPL-SCNC: 104 MMOL/L (ref 100–108)
CO2 SERPL-SCNC: 29 MMOL/L (ref 21–32)
CREAT SERPL-MCNC: 0.38 MG/DL (ref 0.6–1.3)
EOSINOPHIL # BLD AUTO: 0.13 THOUSAND/ΜL (ref 0–0.61)
EOSINOPHIL NFR BLD AUTO: 3 % (ref 0–6)
ERYTHROCYTE [DISTWIDTH] IN BLOOD BY AUTOMATED COUNT: 13 % (ref 11.6–15.1)
EST. AVERAGE GLUCOSE BLD GHB EST-MCNC: 91 MG/DL
GFR SERPL CREATININE-BSD FRML MDRD: 131 ML/MIN/1.73SQ M
GLUCOSE SERPL-MCNC: 90 MG/DL (ref 65–140)
HBA1C MFR BLD: 4.8 %
HCT VFR BLD AUTO: 40.2 % (ref 34.8–46.1)
HGB BLD-MCNC: 13.5 G/DL (ref 11.5–15.4)
IMM GRANULOCYTES # BLD AUTO: 0.01 THOUSAND/UL (ref 0–0.2)
IMM GRANULOCYTES NFR BLD AUTO: 0 % (ref 0–2)
INR PPP: 1.04 (ref 0.84–1.19)
LYMPHOCYTES # BLD AUTO: 0.95 THOUSANDS/ΜL (ref 0.6–4.47)
LYMPHOCYTES NFR BLD AUTO: 21 % (ref 14–44)
MCH RBC QN AUTO: 33.5 PG (ref 26.8–34.3)
MCHC RBC AUTO-ENTMCNC: 33.6 G/DL (ref 31.4–37.4)
MCV RBC AUTO: 100 FL (ref 82–98)
MONOCYTES # BLD AUTO: 0.37 THOUSAND/ΜL (ref 0.17–1.22)
MONOCYTES NFR BLD AUTO: 8 % (ref 4–12)
NEUTROPHILS # BLD AUTO: 3.05 THOUSANDS/ΜL (ref 1.85–7.62)
NEUTS SEG NFR BLD AUTO: 68 % (ref 43–75)
NRBC BLD AUTO-RTO: 0 /100 WBCS
P AXIS: 29 DEGREES
PLATELET # BLD AUTO: 204 THOUSANDS/UL (ref 149–390)
PMV BLD AUTO: 9.5 FL (ref 8.9–12.7)
POTASSIUM SERPL-SCNC: 3.9 MMOL/L (ref 3.5–5.3)
PR INTERVAL: 150 MS
PROT SERPL-MCNC: 6.8 G/DL (ref 6.4–8.2)
PROTHROMBIN TIME: 13 SECONDS (ref 11.6–14.5)
QRS AXIS: 39 DEGREES
QRSD INTERVAL: 96 MS
QT INTERVAL: 442 MS
QTC INTERVAL: 469 MS
RBC # BLD AUTO: 4.03 MILLION/UL (ref 3.81–5.12)
RH BLD: NEGATIVE
SODIUM SERPL-SCNC: 140 MMOL/L (ref 136–145)
SPECIMEN EXPIRATION DATE: NORMAL
T WAVE AXIS: 65 DEGREES
VENTRICULAR RATE: 68 BPM
WBC # BLD AUTO: 4.52 THOUSAND/UL (ref 4.31–10.16)

## 2020-07-13 PROCEDURE — 36415 COLL VENOUS BLD VENIPUNCTURE: CPT

## 2020-07-13 PROCEDURE — 85025 COMPLETE CBC W/AUTO DIFF WBC: CPT

## 2020-07-13 PROCEDURE — 76856 US EXAM PELVIC COMPLETE: CPT

## 2020-07-13 PROCEDURE — 86850 RBC ANTIBODY SCREEN: CPT

## 2020-07-13 PROCEDURE — 71046 X-RAY EXAM CHEST 2 VIEWS: CPT

## 2020-07-13 PROCEDURE — 86901 BLOOD TYPING SEROLOGIC RH(D): CPT

## 2020-07-13 PROCEDURE — 85730 THROMBOPLASTIN TIME PARTIAL: CPT

## 2020-07-13 PROCEDURE — 93005 ELECTROCARDIOGRAM TRACING: CPT

## 2020-07-13 PROCEDURE — 86900 BLOOD TYPING SEROLOGIC ABO: CPT

## 2020-07-13 PROCEDURE — 85610 PROTHROMBIN TIME: CPT

## 2020-07-13 PROCEDURE — 83036 HEMOGLOBIN GLYCOSYLATED A1C: CPT

## 2020-07-13 PROCEDURE — 76830 TRANSVAGINAL US NON-OB: CPT

## 2020-07-13 PROCEDURE — 80053 COMPREHEN METABOLIC PANEL: CPT

## 2020-07-13 PROCEDURE — 93010 ELECTROCARDIOGRAM REPORT: CPT | Performed by: INTERNAL MEDICINE

## 2020-07-14 ENCOUNTER — TELEPHONE (OUTPATIENT)
Dept: GYNECOLOGIC ONCOLOGY | Facility: CLINIC | Age: 45
End: 2020-07-14

## 2020-07-14 DIAGNOSIS — Z15.01 BRCA1 GENE MUTATION POSITIVE: ICD-10-CM

## 2020-07-14 DIAGNOSIS — Z15.09 BRCA1 GENE MUTATION POSITIVE: ICD-10-CM

## 2020-07-14 DIAGNOSIS — N93.9 ABNORMAL UTERINE BLEEDING (AUB): ICD-10-CM

## 2020-07-14 PROCEDURE — U0003 INFECTIOUS AGENT DETECTION BY NUCLEIC ACID (DNA OR RNA); SEVERE ACUTE RESPIRATORY SYNDROME CORONAVIRUS 2 (SARS-COV-2) (CORONAVIRUS DISEASE [COVID-19]), AMPLIFIED PROBE TECHNIQUE, MAKING USE OF HIGH THROUGHPUT TECHNOLOGIES AS DESCRIBED BY CMS-2020-01-R: HCPCS | Performed by: OBSTETRICS & GYNECOLOGY

## 2020-07-14 NOTE — PRE-PROCEDURE INSTRUCTIONS
Pre-Surgery Instructions:   Medication Instructions    FLUoxetine (PROzac) 10 mg capsule Instructed patient per Anesthesia Guidelines   Multiple Vitamin (MULTIVITAMIN) tablet Instructed patient per Anesthesia Guidelines  Spoke to pt  Medication list reviewed & instructed   As of 7/14 pt to stop MV  Instructed on tylenol only   Am DOS pt ok to take prozac with small sip of water   Showering instructions provided by surgeon office, reviewed @ time of call   Negative COVID screening, in process   Advised no alcohol 24 hour prior  Advised no marijuana 24 hour prior  All instructions verbally understood by patient  No further questions

## 2020-07-14 NOTE — TELEPHONE ENCOUNTER
Patient is scheduled for surgery on 7/21/2020  Her pre admission EKG came back abnormal  Emailed Dr Ai Foley who would like a cardiac clearance  Called patient and left a message for her to call me back as soon as possible to discuss

## 2020-07-15 LAB
INPATIENT: NORMAL
SARS-COV-2 RNA SPEC QL NAA+PROBE: NOT DETECTED

## 2020-07-16 PROBLEM — R94.31 ABNORMAL ECG: Status: ACTIVE | Noted: 2020-07-16

## 2020-07-16 PROBLEM — Z01.810 PREOP CARDIOVASCULAR EXAM: Status: ACTIVE | Noted: 2020-07-16

## 2020-07-17 ENCOUNTER — CONSULT (OUTPATIENT)
Dept: CARDIOLOGY CLINIC | Facility: CLINIC | Age: 45
End: 2020-07-17
Payer: COMMERCIAL

## 2020-07-17 ENCOUNTER — TELEPHONE (OUTPATIENT)
Dept: GYNECOLOGIC ONCOLOGY | Facility: CLINIC | Age: 45
End: 2020-07-17

## 2020-07-17 VITALS
HEIGHT: 65 IN | SYSTOLIC BLOOD PRESSURE: 128 MMHG | HEART RATE: 73 BPM | TEMPERATURE: 99.3 F | BODY MASS INDEX: 28.16 KG/M2 | DIASTOLIC BLOOD PRESSURE: 80 MMHG | WEIGHT: 169 LBS

## 2020-07-17 DIAGNOSIS — Z01.810 PREOP CARDIOVASCULAR EXAM: ICD-10-CM

## 2020-07-17 DIAGNOSIS — R94.31 ABNORMAL ECG: Primary | ICD-10-CM

## 2020-07-17 PROCEDURE — 99203 OFFICE O/P NEW LOW 30 MIN: CPT | Performed by: INTERNAL MEDICINE

## 2020-07-17 PROCEDURE — 93000 ELECTROCARDIOGRAM COMPLETE: CPT | Performed by: INTERNAL MEDICINE

## 2020-07-17 NOTE — TELEPHONE ENCOUNTER
Phone call from patient asking about the letter she has to release her back to work at the end of August  Asks if it is possible for her to get cleared sooner as she is currently working from home and cannot afford to be off of work for that long  Let her know that when she comes in for her first post-op appointment, she can discuss this with Dr Malgorzata Voss at that time  States that if this is the case she will need to cancel her surgery because she just cannot afford all of that time off  Then let her know that I can email Dr Malgorzata Voss and explain to him the situation to see if he would be agreeable to release her to work from home starting on 7/27/20  Explained that I may not hear back from him today so we will leave her surgery on for now as it is and I can always call her first thing on Monday once I hear back from the doctor  She is agreeable to this plan  Has no further questions at this time

## 2020-07-17 NOTE — PROGRESS NOTES
Cardiology Consultation     Valentín Sen  [id number]  1975  [address] [place] [id number]    Reason for visit: Here for preop cardiovascular exam for hysterectomy next week    Had "abnormal ECG)      1  Abnormal ECG  POCT ECG   2  Preop cardiovascular exam  POCT ECG     HPI: The patient is a 45 yr old female with no cardiac hx    She has had bilateral mastectomy for BRCA gene mutation    She is to have hysterectomy next week  She denies chest pain or SOB    She denies edema    She denies palpitations or lightheadedness         Patient Active Problem List   Diagnosis    [facility]    BRCA1 gene mutation positive    Fibroids    Abnormal uterine bleeding (AUB)    Abnormal ECG    Preop cardiovascular exam     Past Medical History:   Diagnosis Date    Cancer ([place] [address] )     History of chemotherapy     History of radiation therapy     PONV (postoperative nausea and vomiting)      Social History     Socioeconomic History    Marital status: /Civil Union     Spouse name: Not on file    Number of children: Not on file    Years of education: Not on file    Highest education level: Not on file   Occupational History    Not on file   Social Needs    Financial resource strain: Not on file    Food insecurity:     Worry: Not on file     Inability: Not on file    Transportation needs:     Medical: Not on file     Non-medical: Not on file   Tobacco Use    Smoking status: Never Smoker    Smokeless tobacco: Never Used   Substance and Sexual Activity    Alcohol use: Yes     Frequency: 4 or more times a week     Comment: 3-5 drinks / day     Drug use: Yes     Types: Marijuana     Comment: 5 days / week     Sexual activity: Not on file   Lifestyle    Physical activity:     Days per week: Not on file     Minutes per session: Not on file    Stress: Not on file   Relationships    Social connections: Talks on phone: Not on file     Gets together: Not on file     Attends Jewish service: Not on file     Active member of club or organization: Not on file     Attends meetings of clubs or organizations: Not on file     Relationship status: Not on file    Intimate partner violence:     Fear of current or ex partner: Not on file     Emotionally abused: Not on file     Physically abused: Not on file     Forced sexual activity: Not on file   Other Topics Concern    Not on file   Social History Narrative    Always uses seat belt    Has smoke detectors      Family History   Problem Relation Age of Onset    Colon cancer Mother     Other Father      Past Surgical History:   Procedure Laterality Date    BREAST SURGERY      Lumpectomy    IR PORT PLACEMENT      & removal     MASTECTOMY      b/l       Current Outpatient Medications:     FLUoxetine (PROzac) 10 mg capsule, TAKE ONE CAPSULE EVERY DAY BEFORE NOON, Disp: 30 capsule, Rfl: 3    Multiple Vitamin (MULTIVITAMIN) tablet, Take 1 tablet by mouth daily, Disp: , Rfl:   Allergies   Allergen Reactions    Penicillins Hives    Iodine Hives     Vitals:    07/17/20 1512   BP: 128/80   Pulse: 73   Temp: 99 3 °F (37 4 °C)   Weight: 76 7 kg (169 lb)   Height: 5' 5" (1 651 m)     Review of Systems:  Review of Systems   Constitutional: Negative for fatigue and unexpected weight change  Respiratory: Negative for cough, shortness of breath and wheezing  Cardiovascular: Negative for chest pain, palpitations and leg swelling  Gastrointestinal: Negative for blood in stool, constipation and diarrhea  Genitourinary: Negative for frequency and hematuria  Musculoskeletal: Negative for arthralgias and back pain  Neurological: Negative for dizziness, light-headedness and headaches         Physical Exam:  Vitals:    07/17/20 1512   BP: 128/80   Pulse: 73   Temp: 99 3 °F (37 4 °C)   Weight: 76 7 kg (169 lb)   Height: 5' 5" (1 651 m)       Physical Exam   Constitutional: She appears well-developed and well-nourished  No distress  HENT:   Head: Normocephalic and atraumatic  Mouth/Throat: Oropharynx is clear and moist  No oropharyngeal exudate  Eyes: Conjunctivae are normal  No scleral icterus  Neck: Neck supple  Normal carotid pulses and no JVD present  Carotid bruit is not present  No thyromegaly present  Cardiovascular: Normal rate, regular rhythm, normal heart sounds and intact distal pulses  Exam reveals no gallop and no friction rub  No murmur heard  Pulmonary/Chest: Breath sounds normal  She has no wheezes  She has no rhonchi  She has no rales  Abdominal: Soft  She exhibits no mass  There is no hepatosplenomegaly  There is no tenderness  Musculoskeletal: She exhibits no edema  Discussion/Summary:  1  Abnormal ECG  Read as septal infarction  Patient on today's EKG does have tiny Q-waves in lead V2 and V3  She has no cardiac history and no cardiac symptoms  I am certain that this does not represent a myocardial infarction  I did tell her that her EKG is a variant and can depend on lead placement at times  No further workup is planned  2  Preoperative cardiovascular clearance  Patient is cleared for surgery and deemed low risk for the procedure  No special precautions are needed          GREYSON Hess MD

## 2020-07-20 ENCOUNTER — ANESTHESIA EVENT (OUTPATIENT)
Dept: PERIOP | Facility: HOSPITAL | Age: 45
End: 2020-07-20
Payer: COMMERCIAL

## 2020-07-20 NOTE — ANESTHESIA PREPROCEDURE EVALUATION
Review of Systems/Medical History  Patient summary reviewed  Chart reviewed  History of anesthetic complications PONV    Cardiovascular   Pulmonary  Negative pulmonary ROS        GI/Hepatic  Negative GI/hepatic ROS          Negative  ROS        Endo/Other  Negative endo/other ROS      GYN    Breast cancer        Hematology  Negative hematology ROS      Musculoskeletal  Negative musculoskeletal ROS        Neurology  Negative neurology ROS      Psychology   Depression ,              Physical Exam    Airway    Mallampati score: II  TM Distance: >3 FB  Neck ROM: full     Dental       Cardiovascular      Pulmonary      Other Findings        Anesthesia Plan  ASA Score- 2     Anesthesia Type- general with ASA Monitors  Additional Monitors:   Airway Plan: ETT  Plan Factors-    Induction- intravenous  Postoperative Plan-     Informed Consent- Anesthetic plan and risks discussed with patient  I personally reviewed this patient with the CRNA  Discussed and agreed on the Anesthesia Plan with the CRNA  Finesse Bojorquez

## 2020-07-21 ENCOUNTER — HOSPITAL ENCOUNTER (OUTPATIENT)
Facility: HOSPITAL | Age: 45
Setting detail: OUTPATIENT SURGERY
Discharge: HOME | End: 2020-07-22
Attending: OBSTETRICS & GYNECOLOGY | Admitting: OBSTETRICS & GYNECOLOGY
Payer: COMMERCIAL

## 2020-07-21 ENCOUNTER — ANESTHESIA (OUTPATIENT)
Dept: PERIOP | Facility: HOSPITAL | Age: 45
End: 2020-07-21
Payer: COMMERCIAL

## 2020-07-21 DIAGNOSIS — Z15.09 BRCA1 GENE MUTATION POSITIVE: ICD-10-CM

## 2020-07-21 DIAGNOSIS — Z15.01 BRCA1 GENE MUTATION POSITIVE: ICD-10-CM

## 2020-07-21 DIAGNOSIS — N93.9 ABNORMAL UTERINE BLEEDING (AUB): Primary | ICD-10-CM

## 2020-07-21 LAB
ABO GROUP BLD: NORMAL
BACTERIA UR QL AUTO: ABNORMAL /HPF
BILIRUB UR QL STRIP: NEGATIVE
CLARITY UR: CLEAR
COLOR UR: YELLOW
ERYTHROCYTE [DISTWIDTH] IN BLOOD BY AUTOMATED COUNT: 12.8 % (ref 11.6–15.1)
EXT PREGNANCY TEST URINE: NEGATIVE
EXT. CONTROL: NORMAL
GLUCOSE UR STRIP-MCNC: NEGATIVE MG/DL
HCT VFR BLD AUTO: 35.4 % (ref 34.8–46.1)
HGB BLD-MCNC: 11.6 G/DL (ref 11.5–15.4)
HGB UR QL STRIP.AUTO: ABNORMAL
HYALINE CASTS #/AREA URNS LPF: ABNORMAL /LPF
KETONES UR STRIP-MCNC: NEGATIVE MG/DL
LEUKOCYTE ESTERASE UR QL STRIP: NEGATIVE
MCH RBC QN AUTO: 33.2 PG (ref 26.8–34.3)
MCHC RBC AUTO-ENTMCNC: 32.8 G/DL (ref 31.4–37.4)
MCV RBC AUTO: 101 FL (ref 82–98)
NITRITE UR QL STRIP: NEGATIVE
NON-SQ EPI CELLS URNS QL MICRO: ABNORMAL /HPF
PH UR STRIP.AUTO: 5 [PH]
PLATELET # BLD AUTO: 163 THOUSANDS/UL (ref 149–390)
PMV BLD AUTO: 9.5 FL (ref 8.9–12.7)
PROT UR STRIP-MCNC: NEGATIVE MG/DL
RBC # BLD AUTO: 3.49 MILLION/UL (ref 3.81–5.12)
RBC #/AREA URNS AUTO: ABNORMAL /HPF
RH BLD: NEGATIVE
SP GR UR STRIP.AUTO: 1.02 (ref 1–1.03)
UROBILINOGEN UR QL STRIP.AUTO: 0.2 E.U./DL
WBC # BLD AUTO: 9.82 THOUSAND/UL (ref 4.31–10.16)
WBC #/AREA URNS AUTO: ABNORMAL /HPF

## 2020-07-21 PROCEDURE — 88112 CYTOPATH CELL ENHANCE TECH: CPT | Performed by: PATHOLOGY

## 2020-07-21 PROCEDURE — 99024 POSTOP FOLLOW-UP VISIT: CPT | Performed by: OBSTETRICS & GYNECOLOGY

## 2020-07-21 PROCEDURE — 81025 URINE PREGNANCY TEST: CPT | Performed by: OBSTETRICS & GYNECOLOGY

## 2020-07-21 PROCEDURE — NC001 PR NO CHARGE: Performed by: PHYSICIAN ASSISTANT

## 2020-07-21 PROCEDURE — 88307 TISSUE EXAM BY PATHOLOGIST: CPT | Performed by: PATHOLOGY

## 2020-07-21 PROCEDURE — 81001 URINALYSIS AUTO W/SCOPE: CPT | Performed by: OBSTETRICS & GYNECOLOGY

## 2020-07-21 PROCEDURE — 58573 TLH W/T/O UTERUS OVER 250 G: CPT | Performed by: OBSTETRICS & GYNECOLOGY

## 2020-07-21 PROCEDURE — 85027 COMPLETE CBC AUTOMATED: CPT | Performed by: OBSTETRICS & GYNECOLOGY

## 2020-07-21 RX ORDER — ACETAMINOPHEN 325 MG/1
650 TABLET ORAL EVERY 6 HOURS SCHEDULED
Status: DISCONTINUED | OUTPATIENT
Start: 2020-07-21 | End: 2020-07-22 | Stop reason: HOSPADM

## 2020-07-21 RX ORDER — IBUPROFEN 600 MG/1
600 TABLET ORAL EVERY 6 HOURS PRN
Status: DISCONTINUED | OUTPATIENT
Start: 2020-07-21 | End: 2020-07-22 | Stop reason: HOSPADM

## 2020-07-21 RX ORDER — ONDANSETRON 2 MG/ML
INJECTION INTRAMUSCULAR; INTRAVENOUS AS NEEDED
Status: DISCONTINUED | OUTPATIENT
Start: 2020-07-21 | End: 2020-07-21 | Stop reason: SURG

## 2020-07-21 RX ORDER — GLYCOPYRROLATE 0.2 MG/ML
INJECTION INTRAMUSCULAR; INTRAVENOUS AS NEEDED
Status: DISCONTINUED | OUTPATIENT
Start: 2020-07-21 | End: 2020-07-21 | Stop reason: SURG

## 2020-07-21 RX ORDER — MAGNESIUM HYDROXIDE 1200 MG/15ML
LIQUID ORAL AS NEEDED
Status: DISCONTINUED | OUTPATIENT
Start: 2020-07-21 | End: 2020-07-21 | Stop reason: HOSPADM

## 2020-07-21 RX ORDER — ONDANSETRON 2 MG/ML
4 INJECTION INTRAMUSCULAR; INTRAVENOUS ONCE AS NEEDED
Status: DISCONTINUED | OUTPATIENT
Start: 2020-07-21 | End: 2020-07-21 | Stop reason: HOSPADM

## 2020-07-21 RX ORDER — NEOSTIGMINE METHYLSULFATE 1 MG/ML
INJECTION INTRAVENOUS AS NEEDED
Status: DISCONTINUED | OUTPATIENT
Start: 2020-07-21 | End: 2020-07-21 | Stop reason: SURG

## 2020-07-21 RX ORDER — HEPARIN SODIUM 5000 [USP'U]/ML
5000 INJECTION, SOLUTION INTRAVENOUS; SUBCUTANEOUS EVERY 8 HOURS SCHEDULED
Status: DISCONTINUED | OUTPATIENT
Start: 2020-07-21 | End: 2020-07-22 | Stop reason: HOSPADM

## 2020-07-21 RX ORDER — GENTAMICIN SULFATE 40 MG/ML
INJECTION, SOLUTION INTRAMUSCULAR; INTRAVENOUS AS NEEDED
Status: DISCONTINUED | OUTPATIENT
Start: 2020-07-21 | End: 2020-07-21 | Stop reason: SURG

## 2020-07-21 RX ORDER — OXYCODONE HYDROCHLORIDE 5 MG/1
5 TABLET ORAL EVERY 4 HOURS PRN
Status: DISCONTINUED | OUTPATIENT
Start: 2020-07-21 | End: 2020-07-22 | Stop reason: HOSPADM

## 2020-07-21 RX ORDER — BUPIVACAINE HYDROCHLORIDE 2.5 MG/ML
INJECTION, SOLUTION EPIDURAL; INFILTRATION; INTRACAUDAL AS NEEDED
Status: DISCONTINUED | OUTPATIENT
Start: 2020-07-21 | End: 2020-07-21 | Stop reason: HOSPADM

## 2020-07-21 RX ORDER — MIDAZOLAM HYDROCHLORIDE 2 MG/2ML
INJECTION, SOLUTION INTRAMUSCULAR; INTRAVENOUS AS NEEDED
Status: DISCONTINUED | OUTPATIENT
Start: 2020-07-21 | End: 2020-07-21 | Stop reason: SURG

## 2020-07-21 RX ORDER — SUCCINYLCHOLINE/SOD CL,ISO/PF 100 MG/5ML
SYRINGE (ML) INTRAVENOUS AS NEEDED
Status: DISCONTINUED | OUTPATIENT
Start: 2020-07-21 | End: 2020-07-21 | Stop reason: SURG

## 2020-07-21 RX ORDER — HYDROMORPHONE HCL/PF 1 MG/ML
SYRINGE (ML) INJECTION AS NEEDED
Status: DISCONTINUED | OUTPATIENT
Start: 2020-07-21 | End: 2020-07-21 | Stop reason: SURG

## 2020-07-21 RX ORDER — HYDROMORPHONE HCL/PF 1 MG/ML
0.5 SYRINGE (ML) INJECTION
Status: DISCONTINUED | OUTPATIENT
Start: 2020-07-21 | End: 2020-07-21 | Stop reason: HOSPADM

## 2020-07-21 RX ORDER — PROPOFOL 10 MG/ML
INJECTION, EMULSION INTRAVENOUS CONTINUOUS PRN
Status: DISCONTINUED | OUTPATIENT
Start: 2020-07-21 | End: 2020-07-21 | Stop reason: SURG

## 2020-07-21 RX ORDER — SODIUM CHLORIDE, SODIUM LACTATE, POTASSIUM CHLORIDE, CALCIUM CHLORIDE 600; 310; 30; 20 MG/100ML; MG/100ML; MG/100ML; MG/100ML
50 INJECTION, SOLUTION INTRAVENOUS CONTINUOUS
Status: DISCONTINUED | OUTPATIENT
Start: 2020-07-21 | End: 2020-07-21

## 2020-07-21 RX ORDER — BISACODYL 10 MG
10 SUPPOSITORY, RECTAL RECTAL DAILY PRN
Status: DISCONTINUED | OUTPATIENT
Start: 2020-07-21 | End: 2020-07-22 | Stop reason: HOSPADM

## 2020-07-21 RX ORDER — DEXAMETHASONE SODIUM PHOSPHATE 10 MG/ML
INJECTION, SOLUTION INTRAMUSCULAR; INTRAVENOUS AS NEEDED
Status: DISCONTINUED | OUTPATIENT
Start: 2020-07-21 | End: 2020-07-21 | Stop reason: SURG

## 2020-07-21 RX ORDER — FENTANYL CITRATE 50 UG/ML
INJECTION, SOLUTION INTRAMUSCULAR; INTRAVENOUS AS NEEDED
Status: DISCONTINUED | OUTPATIENT
Start: 2020-07-21 | End: 2020-07-21 | Stop reason: SURG

## 2020-07-21 RX ORDER — GENTAMICIN SULFATE 40 MG/ML
INJECTION, SOLUTION INTRAMUSCULAR; INTRAVENOUS AS NEEDED
Status: DISCONTINUED | OUTPATIENT
Start: 2020-07-21 | End: 2020-07-21

## 2020-07-21 RX ORDER — METOCLOPRAMIDE HYDROCHLORIDE 5 MG/ML
10 INJECTION INTRAMUSCULAR; INTRAVENOUS ONCE AS NEEDED
Status: DISCONTINUED | OUTPATIENT
Start: 2020-07-21 | End: 2020-07-21 | Stop reason: HOSPADM

## 2020-07-21 RX ORDER — OXYCODONE HYDROCHLORIDE 5 MG/1
5 TABLET ORAL EVERY 6 HOURS PRN
Qty: 15 TABLET | Refills: 0 | Status: SHIPPED | OUTPATIENT
Start: 2020-07-21 | End: 2020-07-31

## 2020-07-21 RX ORDER — ROCURONIUM BROMIDE 10 MG/ML
INJECTION, SOLUTION INTRAVENOUS AS NEEDED
Status: DISCONTINUED | OUTPATIENT
Start: 2020-07-21 | End: 2020-07-21 | Stop reason: SURG

## 2020-07-21 RX ORDER — OXYCODONE HYDROCHLORIDE 10 MG/1
10 TABLET ORAL EVERY 4 HOURS PRN
Status: DISCONTINUED | OUTPATIENT
Start: 2020-07-21 | End: 2020-07-22 | Stop reason: HOSPADM

## 2020-07-21 RX ORDER — KETOROLAC TROMETHAMINE 30 MG/ML
INJECTION, SOLUTION INTRAMUSCULAR; INTRAVENOUS AS NEEDED
Status: DISCONTINUED | OUTPATIENT
Start: 2020-07-21 | End: 2020-07-21 | Stop reason: SURG

## 2020-07-21 RX ORDER — LIDOCAINE HYDROCHLORIDE 10 MG/ML
INJECTION, SOLUTION EPIDURAL; INFILTRATION; INTRACAUDAL; PERINEURAL AS NEEDED
Status: DISCONTINUED | OUTPATIENT
Start: 2020-07-21 | End: 2020-07-21 | Stop reason: SURG

## 2020-07-21 RX ORDER — SODIUM CHLORIDE 9 MG/ML
INJECTION, SOLUTION INTRAVENOUS CONTINUOUS PRN
Status: DISCONTINUED | OUTPATIENT
Start: 2020-07-21 | End: 2020-07-21 | Stop reason: SURG

## 2020-07-21 RX ORDER — SODIUM CHLORIDE, SODIUM LACTATE, POTASSIUM CHLORIDE, CALCIUM CHLORIDE 600; 310; 30; 20 MG/100ML; MG/100ML; MG/100ML; MG/100ML
125 INJECTION, SOLUTION INTRAVENOUS CONTINUOUS
Status: DISCONTINUED | OUTPATIENT
Start: 2020-07-21 | End: 2020-07-21

## 2020-07-21 RX ORDER — ALBUMIN, HUMAN INJ 5% 5 %
12.5 SOLUTION INTRAVENOUS ONCE
Status: COMPLETED | OUTPATIENT
Start: 2020-07-21 | End: 2020-07-21

## 2020-07-21 RX ORDER — CLINDAMYCIN PHOSPHATE 900 MG/50ML
INJECTION INTRAVENOUS AS NEEDED
Status: DISCONTINUED | OUTPATIENT
Start: 2020-07-21 | End: 2020-07-21 | Stop reason: SURG

## 2020-07-21 RX ORDER — KETAMINE HYDROCHLORIDE 50 MG/ML
INJECTION, SOLUTION, CONCENTRATE INTRAMUSCULAR; INTRAVENOUS AS NEEDED
Status: DISCONTINUED | OUTPATIENT
Start: 2020-07-21 | End: 2020-07-21 | Stop reason: SURG

## 2020-07-21 RX ORDER — DEXMEDETOMIDINE HYDROCHLORIDE 100 UG/ML
INJECTION, SOLUTION INTRAVENOUS AS NEEDED
Status: DISCONTINUED | OUTPATIENT
Start: 2020-07-21 | End: 2020-07-21 | Stop reason: SURG

## 2020-07-21 RX ORDER — FENTANYL CITRATE/PF 50 MCG/ML
50 SYRINGE (ML) INJECTION
Status: COMPLETED | OUTPATIENT
Start: 2020-07-21 | End: 2020-07-21

## 2020-07-21 RX ORDER — PROPOFOL 10 MG/ML
INJECTION, EMULSION INTRAVENOUS AS NEEDED
Status: DISCONTINUED | OUTPATIENT
Start: 2020-07-21 | End: 2020-07-21 | Stop reason: SURG

## 2020-07-21 RX ORDER — ONDANSETRON 2 MG/ML
4 INJECTION INTRAMUSCULAR; INTRAVENOUS EVERY 6 HOURS PRN
Status: DISCONTINUED | OUTPATIENT
Start: 2020-07-21 | End: 2020-07-22 | Stop reason: HOSPADM

## 2020-07-21 RX ADMIN — DEXMEDETOMIDINE 0.2 MCG/KG/HR: 100 INJECTION, SOLUTION, CONCENTRATE INTRAVENOUS at 09:15

## 2020-07-21 RX ADMIN — FENTANYL CITRATE 50 MCG: 50 INJECTION INTRAMUSCULAR; INTRAVENOUS at 14:29

## 2020-07-21 RX ADMIN — ROCURONIUM BROMIDE 40 MG: 10 INJECTION, SOLUTION INTRAVENOUS at 07:54

## 2020-07-21 RX ADMIN — KETOROLAC TROMETHAMINE 15 MG: 30 INJECTION, SOLUTION INTRAMUSCULAR at 11:25

## 2020-07-21 RX ADMIN — DEXAMETHASONE SODIUM PHOSPHATE 10 MG: 10 INJECTION, SOLUTION INTRAMUSCULAR; INTRAVENOUS at 08:21

## 2020-07-21 RX ADMIN — KETAMINE HYDROCHLORIDE 10 MG: 50 INJECTION, SOLUTION INTRAMUSCULAR; INTRAVENOUS at 09:56

## 2020-07-21 RX ADMIN — ACETAMINOPHEN 650 MG: 325 TABLET, FILM COATED ORAL at 17:11

## 2020-07-21 RX ADMIN — ACETAMINOPHEN 650 MG: 325 TABLET, FILM COATED ORAL at 23:00

## 2020-07-21 RX ADMIN — CLINDAMYCIN PHOSPHATE 900 MG: 900 INJECTION, SOLUTION INTRAVENOUS at 07:52

## 2020-07-21 RX ADMIN — OXYCODONE HYDROCHLORIDE 5 MG: 5 TABLET ORAL at 19:16

## 2020-07-21 RX ADMIN — FENTANYL CITRATE 50 MCG: 50 INJECTION, SOLUTION INTRAMUSCULAR; INTRAVENOUS at 08:10

## 2020-07-21 RX ADMIN — FENTANYL CITRATE 50 MCG: 50 INJECTION, SOLUTION INTRAMUSCULAR; INTRAVENOUS at 08:29

## 2020-07-21 RX ADMIN — NEOSTIGMINE METHYLSULFATE 3 MG: 1 INJECTION, SOLUTION INTRAVENOUS at 11:38

## 2020-07-21 RX ADMIN — FENTANYL CITRATE 50 MCG: 50 INJECTION INTRAMUSCULAR; INTRAVENOUS at 13:30

## 2020-07-21 RX ADMIN — ALBUMIN (HUMAN) 12.5 G: 12.5 INJECTION, SOLUTION INTRAVENOUS at 13:25

## 2020-07-21 RX ADMIN — HYDROMORPHONE HYDROCHLORIDE 0.5 MG: 1 INJECTION, SOLUTION INTRAMUSCULAR; INTRAVENOUS; SUBCUTANEOUS at 11:25

## 2020-07-21 RX ADMIN — SODIUM CHLORIDE: 0.9 INJECTION, SOLUTION INTRAVENOUS at 07:47

## 2020-07-21 RX ADMIN — ONDANSETRON 4 MG: 2 INJECTION INTRAMUSCULAR; INTRAVENOUS at 11:18

## 2020-07-21 RX ADMIN — FENTANYL CITRATE 50 MCG: 50 INJECTION, SOLUTION INTRAMUSCULAR; INTRAVENOUS at 08:15

## 2020-07-21 RX ADMIN — FENTANYL CITRATE 50 MCG: 50 INJECTION INTRAMUSCULAR; INTRAVENOUS at 13:25

## 2020-07-21 RX ADMIN — SODIUM CHLORIDE: 0.9 INJECTION, SOLUTION INTRAVENOUS at 09:17

## 2020-07-21 RX ADMIN — PROPOFOL 120 MCG/KG/MIN: 10 INJECTION, EMULSION INTRAVENOUS at 07:40

## 2020-07-21 RX ADMIN — ROCURONIUM BROMIDE 10 MG: 10 INJECTION, SOLUTION INTRAVENOUS at 07:42

## 2020-07-21 RX ADMIN — ROCURONIUM BROMIDE 10 MG: 10 INJECTION, SOLUTION INTRAVENOUS at 10:45

## 2020-07-21 RX ADMIN — HEPARIN SODIUM 5000 UNITS: 5000 INJECTION INTRAVENOUS; SUBCUTANEOUS at 22:54

## 2020-07-21 RX ADMIN — MIDAZOLAM 2 MG: 1 INJECTION INTRAMUSCULAR; INTRAVENOUS at 07:32

## 2020-07-21 RX ADMIN — OXYCODONE HYDROCHLORIDE 10 MG: 10 TABLET ORAL at 15:15

## 2020-07-21 RX ADMIN — PROPOFOL 200 MG: 10 INJECTION, EMULSION INTRAVENOUS at 07:42

## 2020-07-21 RX ADMIN — ROCURONIUM BROMIDE 20 MG: 10 INJECTION, SOLUTION INTRAVENOUS at 08:51

## 2020-07-21 RX ADMIN — LIDOCAINE HYDROCHLORIDE 50 MG: 10 INJECTION, SOLUTION EPIDURAL; INFILTRATION; INTRACAUDAL; PERINEURAL at 07:42

## 2020-07-21 RX ADMIN — GENTAMICIN SULFATE 120 MG: 40 INJECTION, SOLUTION INTRAMUSCULAR; INTRAVENOUS at 07:51

## 2020-07-21 RX ADMIN — GLYCOPYRROLATE 0.4 MG: 0.2 INJECTION, SOLUTION INTRAMUSCULAR; INTRAVENOUS at 11:38

## 2020-07-21 RX ADMIN — SODIUM CHLORIDE, SODIUM LACTATE, POTASSIUM CHLORIDE, AND CALCIUM CHLORIDE: .6; .31; .03; .02 INJECTION, SOLUTION INTRAVENOUS at 07:32

## 2020-07-21 RX ADMIN — Medication 100 MG: at 07:42

## 2020-07-21 RX ADMIN — DEXMEDETOMIDINE 12 MCG: 100 INJECTION, SOLUTION, CONCENTRATE INTRAVENOUS at 09:09

## 2020-07-21 RX ADMIN — SODIUM CHLORIDE, SODIUM LACTATE, POTASSIUM CHLORIDE, AND CALCIUM CHLORIDE: .6; .31; .03; .02 INJECTION, SOLUTION INTRAVENOUS at 10:28

## 2020-07-21 RX ADMIN — DEXMEDETOMIDINE 20 MCG: 100 INJECTION, SOLUTION, CONCENTRATE INTRAVENOUS at 08:43

## 2020-07-21 RX ADMIN — ROCURONIUM BROMIDE 10 MG: 10 INJECTION, SOLUTION INTRAVENOUS at 09:51

## 2020-07-21 RX ADMIN — SODIUM CHLORIDE, SODIUM LACTATE, POTASSIUM CHLORIDE, AND CALCIUM CHLORIDE 125 ML/HR: .6; .31; .03; .02 INJECTION, SOLUTION INTRAVENOUS at 13:29

## 2020-07-21 RX ADMIN — KETAMINE HYDROCHLORIDE 30 MG: 50 INJECTION, SOLUTION INTRAMUSCULAR; INTRAVENOUS at 09:16

## 2020-07-21 RX ADMIN — FENTANYL CITRATE 50 MCG: 50 INJECTION, SOLUTION INTRAMUSCULAR; INTRAVENOUS at 07:42

## 2020-07-21 RX ADMIN — FENTANYL CITRATE 50 MCG: 50 INJECTION INTRAMUSCULAR; INTRAVENOUS at 13:40

## 2020-07-21 NOTE — QUICK NOTE
Anaid Palomino  [id number]  7/21/2020  4:57 PM    POST-OP CHECK     S:  Glenna Doyle doing well postoperatively  Pain controlled  Denies nausea/vomiting  Denies chest pain, shortness of breath  No complaints at this time  O:  Vitals:    07/21/20 1450   BP: 122/82   Pulse: 91   Resp: 18   Temp: 98 °F (36 7 °C)   SpO2: 99%       Physical Exam   Constitutional: She is oriented to person, place, and time  She appears well-developed  No distress  HENT:   Head: Normocephalic and atraumatic  Eyes: Pupils are equal, round, and reactive to light  EOM are normal    Neck: Normal range of motion  Cardiovascular: Normal rate and intact distal pulses  Pulmonary/Chest: Effort normal  No respiratory distress  Abdominal: Soft  She exhibits no distension and no mass  There is tenderness (appropriate)  There is no guarding  Pfannensteil incision and laparoscopic incisions c/d/i, closed with suture and glue, no erythema or drainage   Musculoskeletal: Normal range of motion  She exhibits no edema or deformity  Neurological: She is alert and oriented to person, place, and time  Skin: Skin is warm and dry  She is not diaphoretic  Psychiatric: She has a normal mood and affect  Her behavior is normal          A:  Anaid Palomino is now Day of Surgery s/p RA-TLH, BSO with small pfannensteil incision for specimen retrieval for abnormal uterine bleeding and BRCA 1 positive status with history of triple negative breast cancer  She was noted to have cloudy urine on intraoperative cystoscopy      P:  Postoperative care  Routine postop check, ICS, OOB  Regular diet as tolerated, ok to SLIV now  ATC tylenol, PRN motrin, oxycodone 5/10  Antiemetics for nausea/vomiting prn  Follow up am labs  Hemoglobin 13 5 -> 11 6    Cloudy urine  UA pending  Fernando in place until AM    FEN: Regular  DVT ppx: SCDs, heparin      Damaso Hernandez MD  OB/GYN  7/21/2020  4:57 PM

## 2020-07-21 NOTE — SOCIAL WORK
Pt is not a <30 day readmission or a bundle  Risk of unplanned readmission score is 7 (Green)  Pt admitted due to uterine bleeding and BRCA1 gene mutation  Pt went to OR today for laparoscopic hysterectomy   Pt has a history of breast cancer  CM met with pt and pt's , Naveen Underwood ([phone]) at bedside to introduce CM role and begin discharge planning  Pt lives with her  in a 2 story house that has 6 [address]CI[address] with railing  Pt reports being independent with ADLs and ambulation PTA, no use of DME  Pt indicates no history of VNA, STR, inpatient MH treatment or D/A treatment  Pt  drives and works  PCP is Dr Harleen Rose ([phone]) and pt uses [facility] pharmacy in [place] for prescriptions  Pt's  to provide transportation at time of discharge  CM department to remain available for discharge concerns or needs  CM reviewed d/c planning process including the following: identifying help at home, patient preference for d/c planning needs, Discharge Lounge, [facility] Meds to Bed program, availability of treatment team to discuss questions or concerns patient and/or family may have regarding understanding medications and recognizing signs and symptoms once discharged  CM also encouraged patient to follow up with all recommended appointments after discharge  Patient advised of importance for patient and family to participate in managing patients medical well being

## 2020-07-21 NOTE — OP NOTE
OPERATIVE REPORT  PATIENT NAME: Roland Almonte    :  1975  MRN: [id number]  Pt Location: [facility] OR ROOM 14    SURGERY DATE: 2020    Surgeon(s) and Role:     * Koki Randhawa MD - Primary     * Manolo Chinchilla PA-C - Assisting     * Itzel Sandhu MD - Gayle Irizarry MD - Susan Argueta MD - Latricia Roberts MD - Fellow    Preop Diagnosis:  BRCA1 gene mutation positive [Z15 , Z15 09]    Post-Op Diagnosis Codes:     * BRCA1 gene mutation positive [Z15 , Z15 09]    Procedure(s) (LRB):  ROBOTIC TOTAL LAPAROSCOPIC HYSTERECTOMY, BILATERAL SALPINGO-OOPHORECTOMY (N/A)  MINI  LAPAROTOMY FOR RETRIEVAL OF SPECIMAN (N/A)  CYSTOSCOPY (N/A)    Specimen(s):  ID Type Source Tests Collected by Time Destination   1 :  Washing Pelvic Washing NON-GYNECOLOGIC CYTOLOGY Koki Randhawa MD 2020 8101    2 : with cervix Tissue Uterus w/Bilateral Ovaries and Fallopian Tubes TISSUE EXAM Koki Randhawa MD 2020 [id number]        Estimated Blood Loss:   Minimal    Drains:  Urethral Catheter Non-latex 16 Fr  (Active)   Number of days: 0       Anesthesia Type:   General    Operative Indications:  BRCA1 gene mutation positive [Z15 , ]  72-year-old with a personal history of bilateral breast cancer, BRCA 1 mutation positive with abnormal uterine bleeding, 18 week size fibroid uterus presents for risk reducing bilateral salpingo-oophorectomy and hysterectomy  Operative Findings:  1  Exam under anesthesia revealed a grossly normal cervix  The uterus was approximately 18-20 week size  No palpable adnexal masses  2  On laparoscopy, there is no evidence of peritoneal disease  The ovaries were grossly normal   There was a corpus luteum cyst present on the right side  3   Mini-laparotomy was necessary for specimen removal due to uterine size    4  Cystoscopy with bilateral jets and no evidence of bladder injury    Complications:   None    Procedure and Technique:  After informed consent was obtained, the patient was taken the operating room where general endotracheal anesthesia was administered without incident  She was then prepped and draped in normal sterile fashion in the low dorsal lithotomy position  Exam under anesthesia was then performed findings noted as above  A speculum was placed in patient's vagina  The anterior lip of the cervix was grasped with a single-tooth tenaculum  The uterus was then sounded to at least 12 cm  The cervix was dilated with Marilynne Palmyra dilators  A 0 Vicryl stay suture was placed on the cervix and used to secure the URIEL uterine manipulator and koh cup  A Fernando catheter was inserted  Attention was then turned the abdomen  0 25% Marcaine was used to infiltrate the skin prior to placement of each trocar  First insertion site was approximately 5 cm superior to the umbilicus in the midline  A 5 mm skin incision was made using an 11 blade scalpel  Through this was passed a 5 mm trocar under direct visualization into the abdominal cavity  The abdomen is then insufflated to 15 mmHg using CO2 gas  Findings are noted as above  Additional trocars then placed under direct visualization  Two 8 mm robotic trocars were placed on left side  The 5 mm trocar in the midline was exchanged for an 8 mm robotic trocar and an additional 8 mm robotic trocar was placed in the right upper quadrant  A 12 mm assistant port was placed in the right upper quadrant  There were a total of 5 trocar sites  The robot was docked  Peritoneal cytology was obtained  The left retroperitoneal space was opened  The round ligament was able to be cauterized and transected  The ureter was identified coursing normally within the medial leaf of the broad ligament  The infundibulopelvic ligament was then skeletonized, cauterized and transected using the vessel sealing device    A portion of the vesicovaginal space was developed although could not be fully evaluated due to the uterine size  On the patient's right side, the retroperitoneal space was opened  The round ligament was cauterized and transected  The ureter was identified  The infundibulopelvic ligament was then skeletonized, cauterized and transected with the vessel sealer  The vesicovaginal space was then developed on the right side and the majority of the bladder was taken down below the level of the external os of the cervix as marked by the koh cup  This allowed the uterine vessels on the right side to be skeletonized, cauterized and transected  Attention was then turned to the left, the uterus was rotated to allow better visualization anteriorly due to the large myoma that was present on the anterior left fundal portion of the uterus measuring at least 12 cm  The vesicovaginal space was then able to be completed using monopolar cautery  The bladder was backfilled to confirm that it was below the cervix completely prior to skeletonization of the left uterine vasculature  The uterine vessels on left side were then cauterized and transected using the vessel sealer  Bipolar cautery was then used to take down both cardinal ligaments  A circumferential colpotomy was then performed using monopolar cautery  The uterine manipulator and koh cup were then removed  A large anchor bag was then placed transvaginally  The uterus, cervix, bilateral tubes and ovaries were placed within the large bag  The bag was closed inside the abdominal cavity  The vaginal apex was then closed using a running 2-0 stratafix suture  Additional stratafix was necessary to control hemostasis at the left apex  The pelvis was then irrigated  The pressure in the pelvis was brought down to 5 mmHg  There is no evidence of bleeding identified  The robot was undocked  A 10 cm transverse skin incision was made approximately 2 fingerbreadths above the pubic symphysis  This was made using a scalpel    Was taken down to the underlying layer of fascia using cautery  The fascia was then opened transversely and a Pfannenstiel incision was made  The peritoneum was then identified and entered sharply  The gas was expelled from the abdominal cavity  The bag was then identified and pulled through the incision  A portion of the large 12 cm myoma required morcellation in the bag  There was no evidence of any spillage of material into the abdominal cavity  The uterus, cervix, bilateral tubes and ovaries were then able to be removed in the bag  The incision was then closed using a running 1  Looped PDS suture  The subcutaneous space was irrigated  The skin was then closed using 2-0 plain suture to the adipose tissue followed by 3-0 stratafix in subcuticular fashion to the skin  The abdomen is then reinsufflated and evaluated by laparoscopy  The abdomen was irrigated  There was no evidence of bleeding identified from any site  The gas was then removed from abdominal cavity  The ports were then removed under direct visualization  The port sites were closed using 4-0 Monocryl followed by Exofin  Exofin was also used to close the transverse incision  A cystoscopy was performed in the usual fashion by insufflating the bladder with 200 cc of normal saline using the suction irrigation device  The 5 mm laparoscope was then used as a cystoscope with findings noted as above  The Fernando catheter was used to drain the bladder and the Fernando catheter was left in  The vagina was inspected  There is no evidence of bleeding identified  The patient was then awakened and transferred to the recovery room stable condition  Instrument counts correct x2 for the procedure  No complications    Estimated blood loss 100 mL     I was present for the entire procedure    Patient Disposition:  PACU     SIGNATURE: Valeria Ferrari MD  DATE: July 21, 2020  TIME: 11:45 AM

## 2020-07-21 NOTE — ANESTHESIA POSTPROCEDURE EVALUATION
Post-Op Assessment Note    CV Status:  Stable  Pain Score: 0    Pain management: adequate     Mental Status:  Lethargic   Hydration Status:  Stable   PONV Controlled:  None   Airway Patency:  Patent   Post Op Vitals Reviewed: Yes      Staff: CRNA   Comments: vss, report to rn          BP      Temp      Pulse     Resp      SpO2

## 2020-07-21 NOTE — PLAN OF CARE
Problem: Potential for Falls  Goal: Patient will remain free of falls  Description  INTERVENTIONS:  - Assess patient frequently for physical needs  -  Identify cognitive and physical deficits and behaviors that affect risk of falls    -  [place] fall precautions as indicated by assessment   - Educate patient/family on patient safety including physical limitations  - Instruct patient to call for assistance with activity based on assessment  - Modify environment to reduce risk of injury  - Consider OT/PT consult to assist with strengthening/mobility  Outcome: Progressing     Problem: PAIN - ADULT  Goal: Verbalizes/displays adequate comfort level or baseline comfort level  Description  Interventions:  - Encourage patient to monitor pain and request assistance  - Assess pain using appropriate pain scale  - Administer analgesics based on type and severity of pain and evaluate response  - Implement non-pharmacological measures as appropriate and evaluate response  - Consider cultural and social influences on pain and pain management  - Notify physician/advanced practitioner if interventions unsuccessful or patient reports new pain  Outcome: Progressing     Problem: SAFETY ADULT  Goal: Maintain or return to baseline ADL function  Description  INTERVENTIONS:  -  Assess patient's ability to carry out ADLs; assess patient's baseline for ADL function and identify physical deficits which impact ability to perform ADLs (bathing, care of mouth/teeth, toileting, grooming, dressing, etc )  - Assess/evaluate cause of self-care deficits   - Assess range of motion  - Assess patient's mobility; develop plan if impaired  - Assess patient's need for assistive devices and provide as appropriate  - Encourage maximum independence but intervene and supervise when necessary  - Involve family in performance of ADLs  - Assess for home care needs following discharge   - Consider OT consult to assist with ADL evaluation and planning for discharge  - Provide patient education as appropriate  Outcome: Progressing  Goal: Maintain or return mobility status to optimal level  Description  INTERVENTIONS:  - Assess patient's baseline mobility status (ambulation, transfers, stairs, etc )    - Identify cognitive and physical deficits and behaviors that affect mobility  - Identify mobility aids required to assist with transfers and/or ambulation (gait belt, sit-to-stand, lift, walker, cane, etc )  - [place] fall precautions as indicated by assessment  - Record patient progress and toleration of activity level on Mobility SBAR; progress patient to next Phase/Stage  - Instruct patient to call for assistance with activity based on assessment  - Consider rehabilitation consult to assist with strengthening/weightbearing, etc   Outcome: Progressing     Problem: DISCHARGE PLANNING  Goal: Discharge to home or other facility with appropriate resources  Description  INTERVENTIONS:  - Identify barriers to discharge w/patient and caregiver  - Arrange for needed discharge resources and transportation as appropriate  - Identify discharge learning needs (meds, wound care, etc )  - Refer to Case Management Department for coordinating discharge planning if the patient needs post-hospital services based on physician/advanced practitioner order or complex needs related to functional status, cognitive ability, or social support system   Outcome: Progressing     Problem: Knowledge Deficit  Goal: Patient/family/caregiver demonstrates understanding of disease process, treatment plan, medications, and discharge instructions  Description  Complete learning assessment and assess knowledge base    Interventions:  - Provide teaching at level of understanding  - Provide teaching via preferred learning methods  Outcome: Progressing

## 2020-07-21 NOTE — H&P
Assessment/Plan:    45-year-old para 1 who does not desire future fertility with a history of bilateral triple negative breast cancer status post bilateral mastectomies who was last treated for breast cancer in 2011  She has a BRCA 1 mutation  She has abnormal uterine bleeding and a 15 week size fibroid uterus  Her performance status is 0   1  Plan for possible robotic assisted total laparoscopic hysterectomy, bilateral salpingo-oophorectomy, possible exploratory laparotomy with ovarian cancer staging including omentectomy, peritoneal biopsies, lymphadenectomy in the event that malignancy is identified the time of surgery  CHIEF COMPLAINT:  Here for surgery        Patient ID: Sugar Carter is a 45 y o  female  45-year-old presents for surgery  There has been no interval change in her medications or medical history since her last visit  She is currently asymptomatic  Her bleeding has improved  Review of Systems   Constitutional: Negative for activity change and unexpected weight change  HENT: Negative  Eyes: Negative  Respiratory: Negative  Cardiovascular: Negative  Gastrointestinal: Negative for abdominal distention and abdominal pain  Endocrine: Negative  Genitourinary: Negative for pelvic pain and vaginal bleeding  Musculoskeletal: Negative  Skin: Negative  Allergic/Immunologic: Negative  Neurological: Negative  Hematological: Negative  Psychiatric/Behavioral: Negative          Current Facility-Administered Medications   Medication Dose Route Frequency Provider Last Rate Last Dose    lactated ringers infusion  50 mL/hr Intravenous Continuous Junior Andrew MD           Allergies   Allergen Reactions    Penicillins Hives    Iodine Hives       Past Medical History:   Diagnosis Date    Cancer ([address] )     History of chemotherapy     History of radiation therapy     PONV (postoperative nausea and vomiting)        Past Surgical History:   Procedure Laterality Date    BREAST SURGERY      Lumpectomy    IR PORT PLACEMENT      & removal     MASTECTOMY      b/l       OB History        1    Para   1    Term                AB        Living           SAB        TAB        Ectopic        Multiple        Live Births                     Family History   Problem Relation Age of Onset    Colon cancer Mother     Other Father        The following portions of the patient's history were reviewed and updated as appropriate: allergies, current medications, past family history, past medical history, past social history, past surgical history and problem list       Objective:    Blood pressure 144/89, pulse 82, temperature 98 8 °F (37 1 °C), temperature source Tympanic, resp  rate 20, height 5' 5" (1 651 m), weight 77 1 kg (170 lb), SpO2 98 %  Body mass index is 28 29 kg/m²  Physical Exam   Constitutional: She is oriented to person, place, and time  She appears well-developed and well-nourished  No distress  HENT:   Head: Normocephalic and atraumatic  Eyes: Conjunctivae and EOM are normal  Right eye exhibits no discharge  Left eye exhibits no discharge  No scleral icterus  Cardiovascular: Normal rate, regular rhythm and normal heart sounds  Exam reveals no gallop and no friction rub  No murmur heard  Pulmonary/Chest: Effort normal and breath sounds normal  No stridor  No respiratory distress  She has no wheezes  She has no rales  Musculoskeletal: She exhibits no edema  Neurological: She is alert and oriented to person, place, and time  Skin: Skin is warm and dry  No rash noted  She is not diaphoretic  No erythema  No pallor  Psychiatric: She has a normal mood and affect   Her behavior is normal  Judgment and thought content normal          No results found for:   Lab Results   Component Value Date    WBC 4 52 2020    HGB 13 5 2020    HCT 40 2 2020     (H) 2020     2020     Lab Results Component Value Date    K 3 9 07/13/2020     07/13/2020    CO2 29 07/13/2020    BUN 7 07/13/2020    CREATININE 0 38 (L) 07/13/2020    CALCIUM 8 7 07/13/2020    AST 38 07/13/2020    ALT 50 07/13/2020    ALKPHOS 86 07/13/2020    EGFR 131 07/13/2020

## 2020-07-22 VITALS
BODY MASS INDEX: 28.32 KG/M2 | HEART RATE: 85 BPM | DIASTOLIC BLOOD PRESSURE: 79 MMHG | SYSTOLIC BLOOD PRESSURE: 133 MMHG | OXYGEN SATURATION: 95 % | WEIGHT: 170 LBS | RESPIRATION RATE: 17 BRPM | HEIGHT: 65 IN | TEMPERATURE: 98.2 F

## 2020-07-22 LAB
ANION GAP SERPL CALCULATED.3IONS-SCNC: 5 MMOL/L (ref 4–13)
BUN SERPL-MCNC: 5 MG/DL (ref 5–25)
CALCIUM SERPL-MCNC: 8.8 MG/DL (ref 8.3–10.1)
CHLORIDE SERPL-SCNC: 110 MMOL/L (ref 100–108)
CO2 SERPL-SCNC: 24 MMOL/L (ref 21–32)
CREAT SERPL-MCNC: 0.36 MG/DL (ref 0.6–1.3)
ERYTHROCYTE [DISTWIDTH] IN BLOOD BY AUTOMATED COUNT: 12.9 % (ref 11.6–15.1)
GFR SERPL CREATININE-BSD FRML MDRD: 133 ML/MIN/1.73SQ M
GLUCOSE SERPL-MCNC: 105 MG/DL (ref 65–140)
HCT VFR BLD AUTO: 34.5 % (ref 34.8–46.1)
HGB BLD-MCNC: 11.4 G/DL (ref 11.5–15.4)
MAGNESIUM SERPL-MCNC: 2 MG/DL (ref 1.6–2.6)
MCH RBC QN AUTO: 33.2 PG (ref 26.8–34.3)
MCHC RBC AUTO-ENTMCNC: 33 G/DL (ref 31.4–37.4)
MCV RBC AUTO: 101 FL (ref 82–98)
PLATELET # BLD AUTO: 212 THOUSANDS/UL (ref 149–390)
PMV BLD AUTO: 10 FL (ref 8.9–12.7)
POTASSIUM SERPL-SCNC: 4 MMOL/L (ref 3.5–5.3)
RBC # BLD AUTO: 3.43 MILLION/UL (ref 3.81–5.12)
SODIUM SERPL-SCNC: 139 MMOL/L (ref 136–145)
WBC # BLD AUTO: 8.94 THOUSAND/UL (ref 4.31–10.16)

## 2020-07-22 PROCEDURE — 80048 BASIC METABOLIC PNL TOTAL CA: CPT | Performed by: OBSTETRICS & GYNECOLOGY

## 2020-07-22 PROCEDURE — 85027 COMPLETE CBC AUTOMATED: CPT | Performed by: OBSTETRICS & GYNECOLOGY

## 2020-07-22 PROCEDURE — 83735 ASSAY OF MAGNESIUM: CPT | Performed by: OBSTETRICS & GYNECOLOGY

## 2020-07-22 PROCEDURE — 99024 POSTOP FOLLOW-UP VISIT: CPT | Performed by: OBSTETRICS & GYNECOLOGY

## 2020-07-22 RX ADMIN — OXYCODONE HYDROCHLORIDE 5 MG: 5 TABLET ORAL at 09:54

## 2020-07-22 RX ADMIN — ACETAMINOPHEN 650 MG: 325 TABLET, FILM COATED ORAL at 05:06

## 2020-07-22 RX ADMIN — HEPARIN SODIUM 5000 UNITS: 5000 INJECTION INTRAVENOUS; SUBCUTANEOUS at 05:06

## 2020-07-22 RX ADMIN — OXYCODONE HYDROCHLORIDE 5 MG: 5 TABLET ORAL at 05:42

## 2020-07-22 RX ADMIN — ACETAMINOPHEN 650 MG: 325 TABLET, FILM COATED ORAL at 11:17

## 2020-07-22 NOTE — PROGRESS NOTES
Gyn Oncology Progress note   Heidy Meals 45 y o  female MRN: [id number]  Unit/Bed#: -[facility]ie Encounter: [id number]      Assessment: 45 y o  female POD#1 s/p RA-TLH, BSO and mini-laparotomy for retrieval of specimen for BRCA1 gene mutation  Plan:    #1  Post Operative Care  - Pain control with Roxicodone and Tylenol  - Post op Hb 11 6, 7/22 Hb 11 4  - DVT ppx with SQ Heparin  - Zofran PRN for nausea   - DC Fernando today  - Encouraged ambulation  - For discharge today    #2  Cloudy urine  - UA showed +large amount of blood and 2-4WBCs, negative for nitrites and leukocyte esterase    FEN: regular diet  DVT ppx: Heparin 5000U SQ TID  Disposition: home      Subjective:    Glenna Doyle has no current complaints  Pain is well controlled with Roxicodone and Tylenol  Fernando in place  She is ambulating  Patient is not currently passing flatus and has had no bowel movement  She is tolerating PO, and denies nausea or vomitting  Patient denies fever, chills, chest pain, shortness of breath, calf tenderness, or dysuria  Objective:  /80   Pulse 86   Temp 98 5 °F (36 9 °C)   Resp 18   Ht 5' 5" (1 651 m)   Wt 77 1 kg (170 lb)   SpO2 97%   BMI 28 29 kg/m²     Physical Exam   Constitutional: She is oriented to person, place, and time  She appears well-developed and well-nourished  HENT:   Head: Normocephalic and atraumatic  Neck: Normal range of motion  Cardiovascular: Normal rate, regular rhythm and normal heart sounds  Pulmonary/Chest: Effort normal and breath sounds normal    Abdominal: Soft  Bowel sounds are normal  She exhibits no distension  There is no tenderness  Incisions clean, dry and intact  Genitourinary:   Genitourinary Comments: Fernando in place draining clear yellow urine   Musculoskeletal: Normal range of motion  Neurological: She is alert and oriented to person, place, and time  Skin: Skin is warm and dry  Psychiatric: She has a normal mood and affect   Her behavior is normal          I/O last 3 completed shifts: In: 3604 2 [I V :3604 2]  Out: 1750 [Urine:1750]  No intake/output data recorded      Lab Results   Component Value Date    WBC 8 94 07/22/2020    HGB 11 4 (L) 07/22/2020    HCT 34 5 (L) 07/22/2020     (H) 07/22/2020     07/22/2020       Lab Results   Component Value Date    CALCIUM 8 8 07/22/2020    K 4 0 07/22/2020    CO2 24 07/22/2020     (H) 07/22/2020    BUN 5 07/22/2020    CREATININE 0 36 (L) 07/22/2020           Pablo Nicholson MD  PGY-1, OB/GYN  7/22/2020 7:14 AM

## 2020-07-22 NOTE — DISCHARGE INSTRUCTIONS
[facility]  [facility]  [phone]    Hysterectomy Discharge Instructions    WHAT YOU NEED TO KNOW:   A hysterectomy is surgery to remove your uterus  Your ovaries, fallopian tubes, cervix, or part of your vagina may also need to be removed  The organs and tissue that will be removed depends on your medical condition  After a hysterectomy, you will not be able to become pregnant  If your ovaries are removed, you will go through menopause if you have not already  DISCHARGE INSTRUCTIONS:   Contact your doctor at the number above if:   · You have a fever over 101o  · You have nausea or are vomiting that does not improve after a light meal    · Your pain is getting worse, even after you take medicine  · You feel pain or burning when you urinate, or you have trouble urinating  · You have pus or a foul-smelling odor coming from your vagina  · Your wound is red, swollen, or draining pus  · You see new or an increased amount of bright red blood coming from your vagina or your incisions  · You have questions or concerns about your condition or care  Seek care immediately:   · Your arm or leg feels warm, tender, and painful  It may look swollen and red  · You have increasing abdominal or pelvic pain  · You have heavy vaginal bleeding that fills 1 or more sanitary pads in 1 hour  Call 911 for any of the following:   · You feel lightheaded, short of breath, and have chest pain  · You cough up blood  Medicines: You may need any of the following:  · Prescription medicine may be given  You may receive a prescription for pain medication or be advised to use over the counter (OTC) pain medication such as acetaminophen (Tylenol) or ibuprofen (Advil, Motrin)  Ask your healthcare provider how to take this medicine safely  · NSAIDs , such as ibuprofen, help decrease swelling, pain, and fever   NSAIDs can cause stomach bleeding or kidney problems in certain people  If you take blood thinner medicine, always ask your healthcare provider if NSAIDs are safe for you  Always read the medicine label and follow directions  · Stool softeners help treat or prevent constipation  · Take your medicine as directed  Contact your healthcare provider if you think your medicine is not helping or if you have side effects  Tell him or her if you are allergic to any medicine  Keep a list of the medicines, vitamins, and herbs you take  Include the amounts, and when and why you take them  Bring the list or the pill bottles to follow-up visits  Carry your medicine list with you in case of an emergency  Activity:   · Rest as needed  Get up and move around as directed to help prevent blood clots  Start with short walks and slowly increase the distance every day  Limit the number of times you climb stairs to 2 times each day for the first week  Plan most of your daily activities on one level of your home  · Do not lift objects heavier than 10 pounds for 6 weeks  Avoid strenuous activity for 2 weeks  · Do not strain during bowel movements  High-fiber foods and extra liquids can help you prevent constipation  Examples of high-fiber foods are fruit and bran  Prune juice and water are good liquids to drink  · Do not have sex, use tampons, or douche for up to 8 weeks  You may shower as soon as the day after surgery  Tub baths can be taken starting 2 weeks after surgery  Do not go into pools or hot tubs until cleared by your doctor  · Ask when it is safe for you to drive  It is generally safe to drive after 2 weeks and when no longer taking prescription pain medication  · Ask when you may return to work and to other regular activities  Wound care: Care for your abdominal incisions as directed  Carefully wash around the wound with soap and water   If you have Hibiclens or medicated soap that you were instructed to use before surgery, you may use that to wash with for up to 2 days after surgery  If not, any mild non-scented, non-abrasive soap is safe  It is okay to let the soap and water run over your incision  Do not scrub your incision  Dry the area and put on new, clean bandages as directed  Change your bandages when they get wet or dirty  If you have strips of medical tape, let them fall off on their own  It may take 7 to 14 days for them to fall off  Check your incision every day for redness, swelling, or pus  Deep breathing: Take deep breaths and cough 10 times each hour  This will decrease your risk for a lung infection  Take a deep breath and hold it for as long as you can  Let the air out and then cough strongly  Deep breaths help open your airway  You may be given an incentive spirometer to help you take deep breaths  Put the plastic piece in your mouth and take a slow, deep breath, then let the air out and cough  Repeat these steps 10 times every hour  Get support: This surgery may be life-changing for you and your family  You will no longer be able to get pregnant  Sudden changes in the levels of your hormones may occur and cause mood swings and depression  You may feel angry, sad, or frightened, or cry frequently and unexpectedly  These feelings are normal  Talk to your healthcare provider about where you can get support  You can also ask if hormone replacement medicine is right for you  Follow up with your healthcare provider or gynecologist as directed: You may need to return to have stitches removed, and for other tests  Write down your questions so you remember to ask them during your visits

## 2020-07-24 ENCOUNTER — TELEPHONE (OUTPATIENT)
Dept: GYNECOLOGIC ONCOLOGY | Facility: CLINIC | Age: 45
End: 2020-07-24

## 2020-08-03 ENCOUNTER — TELEPHONE (OUTPATIENT)
Dept: GYNECOLOGIC ONCOLOGY | Facility: CLINIC | Age: 45
End: 2020-08-03

## 2020-08-03 NOTE — TELEPHONE ENCOUNTER
Phone call to patient to complete screening questions for upcoming appointment on 8/6 with Dr Nato Carrillo  Left message to return my call

## 2020-08-06 ENCOUNTER — OFFICE VISIT (OUTPATIENT)
Dept: GYNECOLOGIC ONCOLOGY | Facility: CLINIC | Age: 45
End: 2020-08-06

## 2020-08-06 VITALS
SYSTOLIC BLOOD PRESSURE: 122 MMHG | BODY MASS INDEX: 27.49 KG/M2 | DIASTOLIC BLOOD PRESSURE: 84 MMHG | RESPIRATION RATE: 18 BRPM | WEIGHT: 165 LBS | HEIGHT: 65 IN | TEMPERATURE: 98.1 F | HEART RATE: 69 BPM

## 2020-08-06 DIAGNOSIS — Z15.09 BRCA1 GENE MUTATION POSITIVE: Primary | ICD-10-CM

## 2020-08-06 DIAGNOSIS — Z15.01 BRCA1 GENE MUTATION POSITIVE: Primary | ICD-10-CM

## 2020-08-06 PROCEDURE — 3008F BODY MASS INDEX DOCD: CPT | Performed by: INTERNAL MEDICINE

## 2020-08-06 PROCEDURE — 3008F BODY MASS INDEX DOCD: CPT | Performed by: OBSTETRICS & GYNECOLOGY

## 2020-08-06 PROCEDURE — 99024 POSTOP FOLLOW-UP VISIT: CPT | Performed by: OBSTETRICS & GYNECOLOGY

## 2020-08-06 NOTE — PROGRESS NOTES
Assessment/Plan:    Problem List Items Addressed This Visit        Other    BRCA1 gene mutation positive - Primary     68-year-old status post robotic assisted total laparoscopic hysterectomy, bilateral salpingo-oophorectomy, mini-laparotomy for specimen removal, cystoscopy  She is recovering well from surgery  Performance status is 0   1  I reviewed benign pathology results  2  Return in 4 weeks for postoperative pelvic examination  3  I discussed ongoing activity limitations  CHIEF COMPLAINT:  Postoperative evaluation       Problem:  Cancer Staging  No matching staging information was found for the patient  Previous therapy:  Oncology History    No history exists  Patient ID: Cheng Forrester is a 45 y o  female  68-year-old returns for postoperative evaluation  She has no complaints  She does not require narcotics  She is ambulatory  She is voiding spontaneously and having normal bowel movements  Minimal vaginal bleeding  The following portions of the patient's history were reviewed and updated as appropriate: allergies, current medications, past family history, past medical history, past social history, past surgical history and problem list     Review of Systems      Current Outpatient Medications:     FLUoxetine (PROzac) 10 mg capsule, TAKE ONE CAPSULE EVERY DAY BEFORE NOON, Disp: 30 capsule, Rfl: 3    Multiple Vitamin (MULTIVITAMIN) tablet, Take 1 tablet by mouth daily, Disp: , Rfl:     Objective:    Blood pressure 122/84, pulse 69, temperature 98 1 °F (36 7 °C), resp  rate 18, height 5' 5" (1 651 m), weight 74 8 kg (165 lb)  Body mass index is 27 46 kg/m²  Body surface area is 1 82 meters squared  Physical Exam   Constitutional: She appears well-developed  Abdominal: Soft  She exhibits no distension  There is no abdominal tenderness  There is no rebound and no guarding     Skin:   Incisions are clean, dry, and intact

## 2020-08-06 NOTE — ASSESSMENT & PLAN NOTE
44-year-old status post robotic assisted total laparoscopic hysterectomy, bilateral salpingo-oophorectomy, mini-laparotomy for specimen removal, cystoscopy  She is recovering well from surgery  Performance status is 0   1  I reviewed benign pathology results  2  Return in 4 weeks for postoperative pelvic examination  3  I discussed ongoing activity limitations

## 2020-08-06 NOTE — LETTER
August 6, 2020     Alberto Lazar, [address] [id number]    Patient: Brandy Lopez   YOB: 1975   Date of Visit: 8/6/2020       Dear Dr Cindy Mcconnell:    Thank you for referring Oliva Lloyd to me for evaluation  Below are my notes for this consultation  If you have questions, please do not hesitate to call me  I look forward to following your patient along with you  Sincerely,        Perlita Muñoz MD        CC: MD Perlita Rosa MD  8/6/2020  8:55 AM  Sign when Signing Visit  Assessment/Plan:    Problem List Items Addressed This Visit        Other    BRCA1 gene mutation positive - Primary     43-year-old status post robotic assisted total laparoscopic hysterectomy, bilateral salpingo-oophorectomy, mini-laparotomy for specimen removal, cystoscopy  She is recovering well from surgery  Performance status is 0   1  I reviewed benign pathology results  2  Return in 4 weeks for postoperative pelvic examination  3  I discussed ongoing activity limitations  CHIEF COMPLAINT:  Postoperative evaluation       Problem:  Cancer Staging  No matching staging information was found for the patient  Previous therapy:  Oncology History    No history exists  Patient ID: Brandy Lopez is a 45 y o  female  43-year-old returns for postoperative evaluation  She has no complaints  She does not require narcotics  She is ambulatory  She is voiding spontaneously and having normal bowel movements  Minimal vaginal bleeding        The following portions of the patient's history were reviewed and updated as appropriate: allergies, current medications, past family history, past medical history, past social history, past surgical history and problem list     Review of Systems      Current Outpatient Medications:     FLUoxetine (PROzac) 10 mg capsule, TAKE ONE CAPSULE EVERY DAY BEFORE NOON, Disp: 30 capsule, Rfl: 3    Multiple Vitamin (MULTIVITAMIN) tablet, Take 1 tablet by mouth daily, Disp: , Rfl:     Objective:    Blood pressure 122/84, pulse 69, temperature 98 1 °F (36 7 °C), resp  rate 18, height 5' 5" (1 651 m), weight 74 8 kg (165 lb)  Body mass index is 27 46 kg/m²  Body surface area is 1 82 meters squared  Physical Exam   Constitutional: She appears well-developed  Abdominal: Soft  She exhibits no distension  There is no abdominal tenderness  There is no rebound and no guarding     Skin:   Incisions are clean, dry, and intact

## 2020-08-10 ENCOUNTER — DOCUMENTATION (OUTPATIENT)
Dept: RADIATION ONCOLOGY | Facility: HOSPITAL | Age: 45
End: 2020-08-10

## 2020-08-10 NOTE — PROGRESS NOTES
Reviewed pt's DT upon which she rated her distress as a 1 with nervousness cited as the only problem  Reviewed pt's Epic record  Placed a call to the pt-the call went to   Left a message with my name and contact info as well as the purpose of the call  Invited the pt to call back should she wish to discuss the nervousness she is experiencing

## 2020-08-17 ENCOUNTER — APPOINTMENT (OUTPATIENT)
Dept: LAB | Facility: CLINIC | Age: 45
End: 2020-08-17
Payer: COMMERCIAL

## 2020-08-17 DIAGNOSIS — R35.0 URINE FREQUENCY: ICD-10-CM

## 2020-08-17 DIAGNOSIS — R35.0 URINE FREQUENCY: Primary | ICD-10-CM

## 2020-08-17 PROCEDURE — 87086 URINE CULTURE/COLONY COUNT: CPT

## 2020-08-17 PROCEDURE — 87077 CULTURE AEROBIC IDENTIFY: CPT

## 2020-08-17 PROCEDURE — 87186 SC STD MICRODIL/AGAR DIL: CPT

## 2020-08-17 RX ORDER — SULFAMETHOXAZOLE AND TRIMETHOPRIM 800; 160 MG/1; MG/1
1 TABLET ORAL EVERY 12 HOURS SCHEDULED
Qty: 10 TABLET | Refills: 0 | Status: SHIPPED | OUTPATIENT
Start: 2020-08-17 | End: 2020-08-22

## 2020-08-19 LAB — BACTERIA UR CULT: ABNORMAL

## 2020-09-09 ENCOUNTER — OFFICE VISIT (OUTPATIENT)
Dept: GYNECOLOGIC ONCOLOGY | Facility: HOSPITAL | Age: 45
End: 2020-09-09

## 2020-09-09 VITALS
TEMPERATURE: 98 F | HEART RATE: 86 BPM | WEIGHT: 165 LBS | SYSTOLIC BLOOD PRESSURE: 128 MMHG | HEIGHT: 65 IN | BODY MASS INDEX: 27.49 KG/M2 | OXYGEN SATURATION: 98 % | DIASTOLIC BLOOD PRESSURE: 76 MMHG

## 2020-09-09 DIAGNOSIS — Z15.01 BRCA1 GENE MUTATION POSITIVE: Primary | ICD-10-CM

## 2020-09-09 DIAGNOSIS — Z15.09 BRCA1 GENE MUTATION POSITIVE: Primary | ICD-10-CM

## 2020-09-09 PROCEDURE — 99024 POSTOP FOLLOW-UP VISIT: CPT | Performed by: OBSTETRICS & GYNECOLOGY

## 2020-09-09 NOTE — PROGRESS NOTES
Assessment/Plan:    Problem List Items Addressed This Visit        Other    BRCA1 gene mutation positive - Primary     44-year-old status post robotic assisted total laparoscopic hysterectomy, bilateral salpingo-oophorectomy, mini-laparotomy for specimen removal, cystoscopy for fibroids, BRCA 1 mutation  Final pathology was benign  She is recovering well from surgery  Performance status is 0   1  I discussed ongoing activity limitations  2  She understands that there are no effective screening modalities for primary peritoneal cancer including routine , ultrasounds, CT scans  She was provided with information regarding symptoms and will continue to follow up with her primary gynecologist for annual examinations  CHIEF COMPLAINT:  Postoperative evaluation        Patient ID: Sammy Salgado is a 45 y o  female  44-year-old returns for postoperative evaluation  She has no complaints  She is back to her normal level of activity  The following portions of the patient's history were reviewed and updated as appropriate: allergies, current medications, past family history, past medical history, past social history, past surgical history and problem list     Review of Systems   Constitutional: Negative for activity change and unexpected weight change  HENT: Negative  Eyes: Negative  Respiratory: Negative  Cardiovascular: Negative  Gastrointestinal: Negative for abdominal distention and abdominal pain  Endocrine: Negative  Genitourinary: Negative for pelvic pain and vaginal bleeding  Musculoskeletal: Negative  Skin: Negative  Allergic/Immunologic: Negative  Neurological: Negative  Hematological: Negative  Psychiatric/Behavioral: Negative            Current Outpatient Medications:     FLUoxetine (PROzac) 10 mg capsule, TAKE ONE CAPSULE EVERY DAY BEFORE NOON, Disp: 30 capsule, Rfl: 3    Multiple Vitamin (MULTIVITAMIN) tablet, Take 1 tablet by mouth daily, Disp: , Rfl:     Objective:    Blood pressure 128/76, pulse 86, temperature 98 °F (36 7 °C), temperature source Oral, height 5' 5" (1 651 m), weight 74 8 kg (165 lb), SpO2 98 %  Body mass index is 27 46 kg/m²  Body surface area is 1 82 meters squared  Physical Exam  Constitutional:       Appearance: She is well-developed  Abdominal:      General: There is no distension  Palpations: Abdomen is soft  Tenderness: There is no abdominal tenderness  There is no guarding or rebound  Genitourinary:     Comments: Vaginal apex healing well  Mobile     Skin:     Comments: Incisions clean, dry, intact

## 2020-09-09 NOTE — ASSESSMENT & PLAN NOTE
51-year-old status post robotic assisted total laparoscopic hysterectomy, bilateral salpingo-oophorectomy, mini-laparotomy for specimen removal, cystoscopy for fibroids, BRCA 1 mutation  Final pathology was benign  She is recovering well from surgery  Performance status is 0   1  I discussed ongoing activity limitations  2  She understands that there are no effective screening modalities for primary peritoneal cancer including routine , ultrasounds, CT scans  She was provided with information regarding symptoms and will continue to follow up with her primary gynecologist for annual examinations

## 2020-10-01 ENCOUNTER — OFFICE VISIT (OUTPATIENT)
Dept: GYNECOLOGIC ONCOLOGY | Facility: HOSPITAL | Age: 45
End: 2020-10-01

## 2020-10-01 VITALS
HEART RATE: 84 BPM | TEMPERATURE: 97.2 F | OXYGEN SATURATION: 97 % | DIASTOLIC BLOOD PRESSURE: 80 MMHG | HEIGHT: 65 IN | SYSTOLIC BLOOD PRESSURE: 134 MMHG | WEIGHT: 167 LBS | BODY MASS INDEX: 27.82 KG/M2

## 2020-10-01 DIAGNOSIS — Z15.09 BRCA1 GENE MUTATION POSITIVE: Primary | ICD-10-CM

## 2020-10-01 DIAGNOSIS — Z15.01 BRCA1 GENE MUTATION POSITIVE: Primary | ICD-10-CM

## 2020-10-01 PROCEDURE — 99024 POSTOP FOLLOW-UP VISIT: CPT | Performed by: OBSTETRICS & GYNECOLOGY

## 2020-10-15 DIAGNOSIS — F32.A DEPRESSION, UNSPECIFIED DEPRESSION TYPE: ICD-10-CM

## 2020-10-16 RX ORDER — FLUOXETINE 10 MG/1
CAPSULE ORAL
Qty: 30 CAPSULE | Refills: 3 | OUTPATIENT
Start: 2020-10-16

## 2020-11-17 DIAGNOSIS — F32.A DEPRESSION, UNSPECIFIED DEPRESSION TYPE: ICD-10-CM

## 2020-11-17 RX ORDER — FLUOXETINE 10 MG/1
CAPSULE ORAL
Qty: 30 CAPSULE | Refills: 3 | Status: SHIPPED | OUTPATIENT
Start: 2020-11-17 | End: 2021-02-16

## 2020-12-08 ENCOUNTER — OFFICE VISIT (OUTPATIENT)
Dept: HEMATOLOGY ONCOLOGY | Facility: CLINIC | Age: 45
End: 2020-12-08
Payer: COMMERCIAL

## 2020-12-08 VITALS
HEIGHT: 65 IN | SYSTOLIC BLOOD PRESSURE: 140 MMHG | TEMPERATURE: 95.6 F | BODY MASS INDEX: 27.99 KG/M2 | DIASTOLIC BLOOD PRESSURE: 82 MMHG | RESPIRATION RATE: 18 BRPM | WEIGHT: 168 LBS | HEART RATE: 95 BPM | OXYGEN SATURATION: 97 %

## 2020-12-08 DIAGNOSIS — Z17.1 MALIGNANT NEOPLASM OF BOTH BREASTS IN FEMALE, ESTROGEN RECEPTOR NEGATIVE, UNSPECIFIED SITE OF BREAST (HCC): ICD-10-CM

## 2020-12-08 DIAGNOSIS — Z15.01 BRCA1 GENE MUTATION POSITIVE: Primary | ICD-10-CM

## 2020-12-08 DIAGNOSIS — C50.912 MALIGNANT NEOPLASM OF BOTH BREASTS IN FEMALE, ESTROGEN RECEPTOR NEGATIVE, UNSPECIFIED SITE OF BREAST (HCC): ICD-10-CM

## 2020-12-08 DIAGNOSIS — C50.911 MALIGNANT NEOPLASM OF BOTH BREASTS IN FEMALE, ESTROGEN RECEPTOR NEGATIVE, UNSPECIFIED SITE OF BREAST (HCC): ICD-10-CM

## 2020-12-08 DIAGNOSIS — Z15.09 BRCA1 GENE MUTATION POSITIVE: Primary | ICD-10-CM

## 2020-12-08 PROCEDURE — 99214 OFFICE O/P EST MOD 30 MIN: CPT | Performed by: INTERNAL MEDICINE

## 2021-02-16 DIAGNOSIS — F32.A DEPRESSION, UNSPECIFIED DEPRESSION TYPE: ICD-10-CM

## 2021-02-16 RX ORDER — FLUOXETINE 10 MG/1
CAPSULE ORAL
Qty: 90 CAPSULE | Refills: 0 | Status: SHIPPED | OUTPATIENT
Start: 2021-02-16

## 2021-05-07 ENCOUNTER — IMMUNIZATIONS (OUTPATIENT)
Dept: FAMILY MEDICINE CLINIC | Facility: HOSPITAL | Age: 46
End: 2021-05-07

## 2021-05-07 DIAGNOSIS — Z23 ENCOUNTER FOR IMMUNIZATION: Primary | ICD-10-CM

## 2021-05-07 PROCEDURE — 0001A SARS-COV-2 / COVID-19 MRNA VACCINE (PFIZER-BIONTECH) 30 MCG: CPT

## 2021-05-07 PROCEDURE — 91300 SARS-COV-2 / COVID-19 MRNA VACCINE (PFIZER-BIONTECH) 30 MCG: CPT

## 2021-05-15 DIAGNOSIS — F32.A DEPRESSION, UNSPECIFIED DEPRESSION TYPE: ICD-10-CM

## 2021-05-16 RX ORDER — FLUOXETINE 10 MG/1
CAPSULE ORAL
Qty: 90 CAPSULE | Refills: 0 | OUTPATIENT
Start: 2021-05-16

## 2021-05-29 ENCOUNTER — IMMUNIZATIONS (OUTPATIENT)
Dept: FAMILY MEDICINE CLINIC | Facility: HOSPITAL | Age: 46
End: 2021-05-29

## 2021-05-29 DIAGNOSIS — Z23 ENCOUNTER FOR IMMUNIZATION: Primary | ICD-10-CM

## 2021-05-29 PROCEDURE — 0002A SARS-COV-2 / COVID-19 MRNA VACCINE (PFIZER-BIONTECH) 30 MCG: CPT

## 2021-05-29 PROCEDURE — 91300 SARS-COV-2 / COVID-19 MRNA VACCINE (PFIZER-BIONTECH) 30 MCG: CPT

## 2021-07-30 ENCOUNTER — TELEPHONE (OUTPATIENT)
Dept: PSYCHIATRY | Facility: CLINIC | Age: 46
End: 2021-07-30

## 2022-10-06 ENCOUNTER — OFFICE VISIT (OUTPATIENT)
Dept: FAMILY MEDICINE CLINIC | Facility: CLINIC | Age: 47
End: 2022-10-06
Payer: COMMERCIAL

## 2022-10-06 VITALS
BODY MASS INDEX: 28.17 KG/M2 | DIASTOLIC BLOOD PRESSURE: 80 MMHG | HEART RATE: 78 BPM | WEIGHT: 175.3 LBS | HEIGHT: 66 IN | SYSTOLIC BLOOD PRESSURE: 120 MMHG | RESPIRATION RATE: 16 BRPM

## 2022-10-06 DIAGNOSIS — Z12.11 SCREENING FOR MALIGNANT NEOPLASM OF COLON: ICD-10-CM

## 2022-10-06 DIAGNOSIS — C50.912 MALIGNANT NEOPLASM OF BOTH BREASTS IN FEMALE, ESTROGEN RECEPTOR NEGATIVE, UNSPECIFIED SITE OF BREAST (HCC): ICD-10-CM

## 2022-10-06 DIAGNOSIS — Z17.1 MALIGNANT NEOPLASM OF BOTH BREASTS IN FEMALE, ESTROGEN RECEPTOR NEGATIVE, UNSPECIFIED SITE OF BREAST (HCC): ICD-10-CM

## 2022-10-06 DIAGNOSIS — Z13.220 SCREENING FOR LIPID DISORDERS: ICD-10-CM

## 2022-10-06 DIAGNOSIS — Z80.0 FAMILY HISTORY OF COLON CANCER IN MOTHER: ICD-10-CM

## 2022-10-06 DIAGNOSIS — Z13.89 SCREENING FOR BLOOD OR PROTEIN IN URINE: ICD-10-CM

## 2022-10-06 DIAGNOSIS — Z13.6 SCREENING FOR CARDIOVASCULAR CONDITION: ICD-10-CM

## 2022-10-06 DIAGNOSIS — Z13.228 SCREENING FOR METABOLIC DISORDER: ICD-10-CM

## 2022-10-06 DIAGNOSIS — Z12.11 COLON CANCER SCREENING: ICD-10-CM

## 2022-10-06 DIAGNOSIS — E55.9 VITAMIN D DEFICIENCY: ICD-10-CM

## 2022-10-06 DIAGNOSIS — Z01.810 PREOP CARDIOVASCULAR EXAM: Primary | ICD-10-CM

## 2022-10-06 DIAGNOSIS — C50.911 MALIGNANT NEOPLASM OF BOTH BREASTS IN FEMALE, ESTROGEN RECEPTOR NEGATIVE, UNSPECIFIED SITE OF BREAST (HCC): ICD-10-CM

## 2022-10-06 DIAGNOSIS — E04.9 ENLARGED THYROID: ICD-10-CM

## 2022-10-06 PROCEDURE — 99204 OFFICE O/P NEW MOD 45 MIN: CPT | Performed by: NURSE PRACTITIONER

## 2022-10-06 RX ORDER — CHLORAL HYDRATE 500 MG
1000 CAPSULE ORAL DAILY
COMMUNITY

## 2022-10-06 RX ORDER — PREDNISOLONE ACETATE 10 MG/ML
SUSPENSION/ DROPS OPHTHALMIC
COMMUNITY
Start: 2022-09-28

## 2022-10-06 RX ORDER — POLYMYXIN B SULFATE AND TRIMETHOPRIM 1; 10000 MG/ML; [USP'U]/ML
SOLUTION OPHTHALMIC
COMMUNITY
Start: 2022-09-28

## 2022-10-06 NOTE — PROGRESS NOTES
Assessment/Plan:     Diagnoses and all orders for this visit:    Screening for malignant neoplasm of colon  -     Ambulatory Referral to Gastroenterology; Future  -     Ambulatory referral for colonoscopy; Future    Family history of colon cancer in mother  -     Ambulatory Referral to Gastroenterology; Future    Colon cancer screening  -     Ambulatory referral for colonoscopy; Future    Enlarged thyroid  -     TSH, 3rd generation with Free T4 reflex; Future  -     US thyroid; Future    Check TSH and US of thyroid  We will contact pt with results once available  Patient is encouraged to call our office for any questions/concerns, persistent or worsening symptoms  Patient states they understand and agree with treatment plan  Screening for cardiovascular condition  -     CBC and differential; Future    Screening for metabolic disorder  -     Comprehensive metabolic panel; Future    Screening for lipid disorders  -     Lipid panel; Future    Vitamin D deficiency  -     Vitamin D 25 hydroxy; Future    Screening for blood or protein in urine  -     UA (URINE) with reflex to Scope    Malignant neoplasm of both breasts in female, estrogen receptor negative, unspecified site of breast ([facility] [address] )    Completion of chemotherapy and jay mastectomy done in the past         Pt to f/u PRN  F/u pending results  Subjective:      Patient ID: Filemon Garcia is a 41 y o  female  New/old patient here to re-establish care as well as for pre-op exam   She does note today that she has always been told during physicals that she has had a larger thyroid  She notes this was never evaluated before through blood work or ultrasound  She notes that she has never had any neck/throat pain or difficulty swallowing or breathing  Over the last year, patient feels as though her thyroid has gotten bigger        The following portions of the patient's history were reviewed and updated as appropriate: allergies, current medications, past family history, past medical history, past social history, past surgical history and problem list     Review of Systems    As noted per HPI  Objective:      /80   Pulse 78   Resp 16   Ht 5' 5 5" (1 664 m)   Wt 79 5 kg (175 lb 4 8 oz)   BMI 28 73 kg/m²          Physical Exam  Vitals reviewed  Constitutional:       General: She is not in acute distress  Appearance: Normal appearance  She is not ill-appearing  HENT:      Head: Normocephalic  Neck:      Thyroid: Thyromegaly present  No thyroid mass or thyroid tenderness  Cardiovascular:      Rate and Rhythm: Normal rate and regular rhythm  Pulses: Normal pulses  Heart sounds: Normal heart sounds  No murmur heard  Pulmonary:      Effort: Pulmonary effort is normal  No respiratory distress  Breath sounds: Normal breath sounds  No wheezing  Abdominal:      General: There is no distension  Palpations: Abdomen is soft  There is no mass  Tenderness: There is no abdominal tenderness  There is no guarding or rebound  Hernia: No hernia is present  Musculoskeletal:         General: Normal range of motion  Skin:     General: Skin is warm and dry  Neurological:      Mental Status: She is alert and oriented to person, place, and time  Mental status is at baseline  Psychiatric:         Mood and Affect: Mood normal          Behavior: Behavior normal          Thought Content:  Thought content normal          Judgment: Judgment normal

## 2022-10-06 NOTE — PROGRESS NOTES
FAMILY PRACTICE PRE-OPERATIVE EVALUATION  [facility] - [facility] FAMILY PRACTICE    NAME: Glenna Doyle  AGE: 41 y o  SEX: female  : 1975     DATE: 10/6/2022        Family Practice Pre-Operative Evaluation      Chief Complaint: Pre-operative Evaluation     Surgery: jay cataract surgery  Anticipated Date of Surgery: 10/17/22, 22  Referring Provider: Dr America Falcon     History of Present Illness:     Planned anesthesia is local and IV sedation  Patient has a bleeding risk of: no recent abnormal bleeding  Current anti-platelet/anti-coagulation medications that the patient is prescribed includes: none  Assessment of Chronic Conditions:   - Breast Cancer: in remission, hx of jay mastectomy, chemo     Assessment of Cardiac Risk:  · Denies unstable or severe angina or MI in the last 6 weeks or history of stent placement in the last year   · Denies decompensated heart failure (e g  New onset heart failure, NYHA functional class IV heart failure, or worsening existing heart failure)  · Denies significant arrhythmias such as high grade AV block, symptomatic ventricular arrhythmia, newly recognized ventricular tachycardia, supraventricular tachycardia with resting heart rate >100, or symptomatic bradycardia  · Denies severe heart valve disease including aortic stenosis or symptomatic mitral stenosis     Exercise Capacity/shortness of breath symptoms/chest pain symptoms:   (this is only relevant for patients NOT having lower extremity joint replacement, therefore cannot walk the distance)  · Able to walk 4 blocks without symptoms?: Yes  · Able to walk 2 flights without symptoms?: Yes    Prior Anesthesia Reactions: Yes, nausea, vomiting     Personal history of venous thromboembolic disease?  No       Review of Systems:       Constitutional  No fever chills no fatigue no weight loss no weight gain    Mental status  No anxiety or depression and no sleep disturbances no changes in personality or emotional problems no suicidal or homicidal ideations    Eyes  No eye pain no red eyes no visual disturbances no discharge no eye itch    ENT  No earache no hearing loss nasal discharge no sore throat no hoarseness no postnasal drip     Cardio  No chest pain no palpitations no leg edema no claudication no dyspnea on exertion no nocturnal dyspnea    Respiratory  No shortness of breath or wheeze no cough no orthopnea no dyspnea on exertion no hemoptysis no sputum production    GI  No abdominal pain no nausea no vomiting no diarrhea or constipation no bloody stools no change in bowel habits no change in weight      no dysuria hematuria no pyuria no incontinence no pelvic pain    Musculoskeletal  No myalgias arthralgias no joint swelling or stiffness no limb pain or swelling    Skin  No rashes or lesions no itchiness and no wounds    Neuro  No headache dizziness lightheadedness syncope numbness paresthesias or confusion    Heme  No swollen glands no easy bruising            Current Problem List:           Allergies:      Allergies   Allergen Reactions    Penicillins Hives    Iodine - Food Allergy Hives       Current Medications:       Current Outpatient Medications:     Multiple Vitamin (MULTIVITAMIN) tablet, Take 1 tablet by mouth daily, Disp: , Rfl:     Omega-3 Fatty Acids (fish oil) 1,000 mg, Take 1,000 mg by mouth daily, Disp: , Rfl:     polymyxin b-trimethoprim (POLYTRIM) ophthalmic solution, PLEASE SEE ATTACHED FOR DETAILED DIRECTIONS (Patient not taking: Reported on 10/6/2022), Disp: , Rfl:     prednisoLONE acetate (PRED FORTE) 1 % ophthalmic suspension, 1 DROP FOUR TIMES A DAY BEGIN POST OP, FOLLOW PHYSICIANS INSTRUCTIONS FOR OPERATIVE EYE (Patient not taking: Reported on 10/6/2022), Disp: , Rfl:     Past Medical History:       Past Medical History:   Diagnosis Date    Cancer ([facility] [address] )     History of chemotherapy     History of radiation therapy     PONV (postoperative nausea and vomiting)         Past Surgical History:   Procedure Laterality Date    BREAST SURGERY      Lumpectomy    CYSTOSCOPY N/A 7/21/2020    Procedure: CYSTOSCOPY;  Surgeon: Ashley Ying MD;  [facility];  Service: Gynecology Oncology    IR PORT PLACEMENT      & removal     LAPAROTOMY N/A 7/21/2020    Procedure: MINI  LAPAROTOMY FOR RETRIEVAL OF SPECIMAN;  Surgeon: Ashley Ying MD;  [facility];  Service: Gynecology Oncology    MASTECTOMY      b/l    IL LAPAROSCOPY W TOT HYSTERECTUTERUS <=250 GRAM  W TUBE/OVARY N/A 7/21/2020    Procedure: ROBOTIC TOTAL LAPAROSCOPIC HYSTERECTOMY, BILATERAL SALPINGO-OOPHORECTOMY;  Surgeon: Ashley Ying MD;  [facility];  Service: Gynecology Oncology        Family History   Problem Relation Age of Onset    Colon cancer Mother     Cancer Father     Atrial fibrillation Father     No Known Problems Sister     No Known Problems Brother     Alcohol abuse Neg Hx     Substance Abuse Neg Hx     Mental illness Neg Hx         Social History     Socioeconomic History    Marital status: /Civil Union     Spouse name: Not on file    Number of children: Not on file    Years of education: Not on file    Highest education level: Not on file   Occupational History    Not on file   Tobacco Use    Smoking status: Never Smoker    Smokeless tobacco: Never Used   Vaping Use    Vaping Use: Never used   Substance and Sexual Activity    Alcohol use: Yes     Comment: Socially    Drug use: Yes     Types: Marijuana     Comment: 5 days / week: Medical    Sexual activity: Not on file   Other Topics Concern    Not on file   Social History Narrative    Always uses seat belt    Has smoke detectors     Social Determinants of Health     Financial Resource Strain: Not on file   Food Insecurity: Not on file   Transportation Needs: Not on file Physical Activity: Not on file   Stress: Not on file   Social Connections: Not on file   Intimate Partner Violence: Not on file   Housing Stability: Not on file        Physical Exam:     /80   Pulse 78   Resp 16   Ht 5' 5 5" (1 664 m)   Wt 79 5 kg (175 lb 4 8 oz)   BMI 28 73 kg/m²     Constitutional  Appears healthy, Looks well, Appearance consistent with age    Mental Status  Alert, Oriented, Cooperative, Memory function normal , clean, and reasonable    Neck  No neck mass, No thyromegaly, Good carotid upstrokes bilaterally, trachea midline positive click    Respiratory  Breath sounds normal, No rales, No rhonchi, No wheezing, normal palpation    Cardiac   Regular rhythm without ectopy or murmur no S3-S4, no heave lift or thrill to palpation    Vascular  No leg edema, No pedal edema    Muscular skeletal  No clubbing cyanosis , muscle tone normal    Skin  No appreciable rashes or abnormal appearing lesions        Data:     Pre-operative work-up    Laboratory Results: None required     EKG: I have personally reviewed pertinent reports  EKG showing sinus rhythm w/ possible septal infarct today  This was noted back in 2020 and patient was cleared by Dr Rajat Davenport  No need for further consultation or workup  Assessment & Recommendations:     1  Preop cardiovascular exam     2  Screening for malignant neoplasm of colon  Ambulatory Referral to Gastroenterology    Ambulatory referral for colonoscopy   3  Family history of colon cancer in mother  Ambulatory Referral to Gastroenterology   4  Colon cancer screening  Ambulatory referral for colonoscopy   5  Enlarged thyroid  TSH, 3rd generation with Free T4 reflex    US thyroid   6  Screening for cardiovascular condition  CBC and differential   7  Screening for metabolic disorder  Comprehensive metabolic panel   8  Screening for lipid disorders  Lipid panel   9  Vitamin D deficiency  Vitamin D 25 hydroxy   10   Screening for blood or protein in urine  UA (URINE) with reflex to Scope   11  Malignant neoplasm of both breasts in female, estrogen receptor negative, unspecified site of breast ([facility] [address] )             Pre-Op Evaluation Plan  1  Further preoperative workup as follows:   - None; no further preoperative work-up is required    2  Medication Management/Recommendations:   - None, continue medication regimen including morning of surgery, with sip of water    3  Patient requires further consultation with: None    Clearance  Patient is CLEARED for surgery without any additional cardiac testing       Kennedy Enrique, [address] [place] [address]  [address] [address] [address] [facility] [address]  Phone#  [phone]  Fax#  [phone] room air

## 2022-10-27 ENCOUNTER — NEW PATIENT (OUTPATIENT)
Dept: URBAN - METROPOLITAN AREA CLINIC 6 | Facility: CLINIC | Age: 47
End: 2022-10-27

## 2022-10-27 DIAGNOSIS — C50.919: ICD-10-CM

## 2022-10-27 DIAGNOSIS — H25.043: ICD-10-CM

## 2022-10-27 PROCEDURE — 99204 OFFICE O/P NEW MOD 45 MIN: CPT

## 2022-10-27 ASSESSMENT — VISUAL ACUITY
OD_GLARE: 20/60
OS_GLARE: 20/60
OD_CC: 20/40
OS_CC: 20/40

## 2022-10-27 ASSESSMENT — TONOMETRY
OD_IOP_MMHG: 18
OS_IOP_MMHG: 17

## 2022-11-03 ENCOUNTER — PRE-OP CATARACT MEASUREMENTS (OUTPATIENT)
Dept: URBAN - METROPOLITAN AREA CLINIC 6 | Facility: CLINIC | Age: 47
End: 2022-11-03

## 2022-11-03 DIAGNOSIS — C50.919: ICD-10-CM

## 2022-11-03 DIAGNOSIS — H25.043: ICD-10-CM

## 2022-11-03 PROCEDURE — 92012 INTRM OPH EXAM EST PATIENT: CPT

## 2022-11-03 PROCEDURE — 92136 OPHTHALMIC BIOMETRY: CPT

## 2022-11-03 ASSESSMENT — VISUAL ACUITY
OD_GLARE: 20/200
OD_PH: 20/40
OS_CC: 20/50
OS_PH: 20/40-1
OD_CC: 20/40
OS_GLARE: 20/400
OU_CC: J5

## 2022-11-03 ASSESSMENT — TONOMETRY
OD_IOP_MMHG: 16
OS_IOP_MMHG: 15

## 2022-11-21 ENCOUNTER — OFFICE VISIT (OUTPATIENT)
Dept: FAMILY MEDICINE CLINIC | Facility: CLINIC | Age: 47
End: 2022-11-21

## 2022-11-21 VITALS
BODY MASS INDEX: 27.64 KG/M2 | RESPIRATION RATE: 16 BRPM | SYSTOLIC BLOOD PRESSURE: 130 MMHG | HEART RATE: 63 BPM | DIASTOLIC BLOOD PRESSURE: 82 MMHG | HEIGHT: 66 IN | WEIGHT: 172 LBS | OXYGEN SATURATION: 98 %

## 2022-11-21 DIAGNOSIS — Z01.818 PREOP EXAMINATION: Primary | ICD-10-CM

## 2022-11-21 RX ORDER — KETOROLAC TROMETHAMINE 5 MG/ML
SOLUTION OPHTHALMIC
COMMUNITY
Start: 2022-11-04

## 2022-11-21 NOTE — PROGRESS NOTES
FAMILY PRACTICE PRE-OPERATIVE EVALUATION  [facility] - [facility]    NAME: Glenna Doyle  AGE: 41 y o  SEX: female  : 1975     DATE: 2022        Family Practice Pre-Operative Evaluation      Chief Complaint: Pre-operative Evaluation     Surgery: jay cataract,  Anticipated Date of Surgery: 22 left, 22 right  Referring Provider: Dr Dangelo Olson     History of Present Illness:     Planned anesthesia is local and IV sedation  Patient has a bleeding risk of: no recent abnormal bleeding  Current anti-platelet/anti-coagulation medications that the patient is prescribed includes: none  Assessment of Chronic Conditions:   -Hx of breast cancer: Completion of jay mastectomy and chemotherapy in the past   -Enlarged Thyroid: Pt still has yet to complete the TSH & u/s thyroid       Assessment of Cardiac Risk:  · Denies unstable or severe angina or MI in the last 6 weeks or history of stent placement in the last year   · Denies decompensated heart failure (e g  New onset heart failure, NYHA functional class IV heart failure, or worsening existing heart failure)  · Denies significant arrhythmias such as high grade AV block, symptomatic ventricular arrhythmia, newly recognized ventricular tachycardia, supraventricular tachycardia with resting heart rate >100, or symptomatic bradycardia  · Denies severe heart valve disease including aortic stenosis or symptomatic mitral stenosis     Exercise Capacity/shortness of breath symptoms/chest pain symptoms:   (this is only relevant for patients NOT having lower extremity joint replacement, therefore cannot walk the distance)  · Able to walk 4 blocks without symptoms?: Yes  · Able to walk 2 flights without symptoms?: Yes    Prior Anesthesia Reactions: Yes, nausea/vomiting     Personal history of venous thromboembolic disease? No       Review of Systems:       Constitutional  No fever chills no fatigue no weight loss no weight gain    Mental status  No anxiety or depression and no sleep disturbances no changes in personality or emotional problems no suicidal or homicidal ideations    Eyes  No eye pain no red eyes no visual disturbances no discharge no eye itch    ENT  No earache no hearing loss nasal discharge no sore throat no hoarseness no postnasal drip     Cardio  No chest pain no palpitations no leg edema no claudication no dyspnea on exertion no nocturnal dyspnea    Respiratory  No shortness of breath or wheeze no cough no orthopnea no dyspnea on exertion no hemoptysis no sputum production    GI  No abdominal pain no nausea no vomiting no diarrhea or constipation no bloody stools no change in bowel habits no change in weight      no dysuria hematuria no pyuria no incontinence no pelvic pain    Musculoskeletal  No myalgias arthralgias no joint swelling or stiffness no limb pain or swelling    Skin  No rashes or lesions no itchiness and no wounds    Neuro  No headache dizziness lightheadedness syncope numbness paresthesias or confusion    Heme  No swollen glands no easy bruising            Current Problem List:           Allergies:      Allergies   Allergen Reactions   • Penicillins Hives   • Iodine - Food Allergy Hives       Current Medications:       Current Outpatient Medications:   •  Multiple Vitamin (MULTIVITAMIN) tablet, Take 1 tablet by mouth daily, Disp: , Rfl:   •  Omega-3 Fatty Acids (fish oil) 1,000 mg, Take 1,000 mg by mouth daily, Disp: , Rfl:   •  ketorolac (ACULAR) 0 5 % ophthalmic solution, INSTILL 1 DROP INTO LEFT EYE FOUR TIMES A DAY START 1 DAY BEFORE SURGERY (Patient not taking: Reported on 11/21/2022), Disp: , Rfl:   •  polymyxin b-trimethoprim (POLYTRIM) ophthalmic solution, PLEASE SEE ATTACHED FOR DETAILED DIRECTIONS (Patient not taking: Reported on 10/6/2022), Disp: , Rfl:   • prednisoLONE acetate (PRED FORTE) 1 % ophthalmic suspension, 1 DROP FOUR TIMES A DAY BEGIN POST OP, FOLLOW PHYSICIANS INSTRUCTIONS FOR OPERATIVE EYE (Patient not taking: Reported on 10/6/2022), Disp: , Rfl:     Past Medical History:       Past Medical History:   Diagnosis Date   • Cancer ([facility] [address] )    • History of chemotherapy    • History of radiation therapy    • PONV (postoperative nausea and vomiting)         Past Surgical History:   Procedure Laterality Date   • BREAST SURGERY      Lumpectomy   • CYSTOSCOPY N/A 7/21/2020    Procedure: CYSTOSCOPY;  Surgeon: Lisa Hardy MD;  [facility];  Service: Gynecology Oncology   • IR PORT PLACEMENT      & removal    • LAPAROTOMY N/A 7/21/2020    Procedure: MINI  LAPAROTOMY FOR RETRIEVAL OF SPECIMAN;  Surgeon: Lisa Hardy MD;  [facility];  Service: Gynecology Oncology   • MASTECTOMY      b/l   • ID LAPAROSCOPY W TOT HYSTERECTUTERUS <=250 GRAM  W TUBE/OVARY N/A 7/21/2020    Procedure: ROBOTIC TOTAL LAPAROSCOPIC HYSTERECTOMY, BILATERAL SALPINGO-OOPHORECTOMY;  Surgeon: Lisa Hardy MD;  [facility];  Service: Gynecology Oncology        Family History   Problem Relation Age of Onset   • Colon cancer Mother    • Cancer Father    • Atrial fibrillation Father    • No Known Problems Sister    • No Known Problems Brother    • Alcohol abuse Neg Hx    • Substance Abuse Neg Hx    • Mental illness Neg Hx         Social History     Socioeconomic History   • Marital status: /Civil Union     Spouse name: Not on file   • Number of children: Not on file   • Years of education: Not on file   • Highest education level: Not on file   Occupational History   • Not on file   Tobacco Use   • Smoking status: Never   • Smokeless tobacco: Never   Vaping Use   • Vaping Use: Never used   Substance and Sexual Activity   • Alcohol use: Yes     Comment: Socially   • Drug use: Yes     Types: Marijuana     Comment: 5 days / week: Medical   • Sexual activity: Not on file   Other Topics Concern   • Not on file   Social History Narrative    Always uses seat belt    Has smoke detectors     Social Determinants of Health     Financial Resource Strain: Not on file   Food Insecurity: Not on file   Transportation Needs: Not on file   Physical Activity: Not on file   Stress: Not on file   Social Connections: Not on file   Intimate Partner Violence: Not on file   Housing Stability: Not on file        Physical Exam:     /82 (BP Location: Left arm, Patient Position: Sitting, Cuff Size: Standard)   Pulse 63   Resp 16   Ht 5' 5 5" (1 664 m)   Wt 78 kg (172 lb)   SpO2 98%   BMI 28 19 kg/m²     Constitutional  Appears healthy, Looks well, Appearance consistent with age    Mental Status  Alert, Oriented, Cooperative, Memory function normal , clean, and reasonable    Neck  No neck mass, No thyromegaly, Good carotid upstrokes bilaterally, trachea midline positive click    Respiratory  Breath sounds normal, No rales, No rhonchi, No wheezing, normal palpation    Cardiac   Regular rhythm without ectopy or murmur no S3-S4, no heave lift or thrill to palpation    Vascular  No leg edema, No pedal edema    Muscular skeletal  No clubbing cyanosis , muscle tone normal    Skin  No appreciable rashes or abnormal appearing lesions        Data:     Pre-operative work-up    Laboratory Results: None required     EKG: None required          Assessment & Recommendations:     No diagnosis found  Pre-Op Evaluation Plan  1  Further preoperative workup as follows:   - None; no further preoperative work-up is required    2  Medication Management/Recommendations:   - None, continue medication regimen including morning of surgery, with sip of water    3  Patient requires further consultation with: None    Clearance  Patient is CLEARED for surgery without any additional cardiac testing       AAKASH Holder  [address]  [address]  [facility] [address]  [address]  Phone#  [phone]  Fax#  [phone]

## 2022-11-29 ENCOUNTER — SURGERY/PROCEDURE (OUTPATIENT)
Dept: URBAN - METROPOLITAN AREA SURGICAL CENTER 6 | Facility: SURGICAL CENTER | Age: 47
End: 2022-11-29

## 2022-11-29 DIAGNOSIS — H25.042: ICD-10-CM

## 2022-11-29 PROCEDURE — 66984 XCAPSL CTRC RMVL W/O ECP: CPT

## 2022-11-30 ENCOUNTER — 1 DAY POST-OP (OUTPATIENT)
Dept: URBAN - METROPOLITAN AREA CLINIC 6 | Facility: CLINIC | Age: 47
End: 2022-11-30

## 2022-11-30 DIAGNOSIS — Z96.1: ICD-10-CM

## 2022-11-30 PROCEDURE — 99024 POSTOP FOLLOW-UP VISIT: CPT

## 2022-11-30 ASSESSMENT — TONOMETRY
OD_IOP_MMHG: 20
OS_IOP_MMHG: 19

## 2022-11-30 ASSESSMENT — VISUAL ACUITY: OS_SC: 20/30-1

## 2022-12-08 ENCOUNTER — 1 WEEK POST-OP (OUTPATIENT)
Dept: URBAN - METROPOLITAN AREA CLINIC 6 | Facility: CLINIC | Age: 47
End: 2022-12-08

## 2022-12-08 DIAGNOSIS — Z96.1: ICD-10-CM

## 2022-12-08 DIAGNOSIS — H25.041: ICD-10-CM

## 2022-12-08 PROCEDURE — 99024 POSTOP FOLLOW-UP VISIT: CPT

## 2022-12-08 PROCEDURE — 92136 OPHTHALMIC BIOMETRY: CPT | Mod: 26,RT

## 2022-12-08 ASSESSMENT — VISUAL ACUITY
OD_SC: 20/40
OS_PH: 20/30
OS_SC: 20/50

## 2022-12-08 ASSESSMENT — KERATOMETRY
OD_AXISANGLE_DEGREES: 180
OD_AXISANGLE2_DEGREES: 90
OD_K2POWER_DIOPTERS: 45.75
OD_K1POWER_DIOPTERS: 45.75

## 2022-12-08 ASSESSMENT — TONOMETRY: OS_IOP_MMHG: 33

## 2022-12-20 ENCOUNTER — SURGERY/PROCEDURE (OUTPATIENT)
Dept: URBAN - METROPOLITAN AREA SURGICAL CENTER 6 | Facility: SURGICAL CENTER | Age: 47
End: 2022-12-20

## 2022-12-20 DIAGNOSIS — H25.041: ICD-10-CM

## 2022-12-20 PROCEDURE — 66984 XCAPSL CTRC RMVL W/O ECP: CPT | Mod: 79,RT

## 2022-12-21 ENCOUNTER — 1 DAY POST-OP (OUTPATIENT)
Dept: URBAN - METROPOLITAN AREA CLINIC 6 | Facility: CLINIC | Age: 47
End: 2022-12-21

## 2022-12-21 DIAGNOSIS — Z96.1: ICD-10-CM

## 2022-12-21 PROCEDURE — 99024 POSTOP FOLLOW-UP VISIT: CPT

## 2022-12-21 ASSESSMENT — TONOMETRY
OS_IOP_MMHG: 9
OD_IOP_MMHG: 15

## 2022-12-21 ASSESSMENT — KERATOMETRY
OD_AXISANGLE2_DEGREES: 90
OD_AXISANGLE_DEGREES: 180
OD_K1POWER_DIOPTERS: 45.75
OD_K2POWER_DIOPTERS: 45.75

## 2022-12-21 ASSESSMENT — VISUAL ACUITY
OD_SC: 20/60
OD_OTHER: 1 DAY PO
OD_PH: 20/30

## 2022-12-28 ENCOUNTER — 1 WEEK POST-OP (OUTPATIENT)
Dept: URBAN - METROPOLITAN AREA CLINIC 6 | Facility: CLINIC | Age: 47
End: 2022-12-28

## 2022-12-28 DIAGNOSIS — Z96.1: ICD-10-CM

## 2022-12-28 PROCEDURE — 99024 POSTOP FOLLOW-UP VISIT: CPT

## 2022-12-28 ASSESSMENT — TONOMETRY
OD_IOP_MMHG: 20
OS_IOP_MMHG: 12

## 2022-12-28 ASSESSMENT — KERATOMETRY
OD_K2POWER_DIOPTERS: 45.75
OD_K1POWER_DIOPTERS: 45.75
OD_AXISANGLE2_DEGREES: 90
OD_AXISANGLE_DEGREES: 180

## 2022-12-28 ASSESSMENT — VISUAL ACUITY
OD_SC: 20/30-1
OU_SC: 20/25
OS_SC: 20/25-1

## 2023-01-19 ENCOUNTER — 1 MONTH POST-OP (OUTPATIENT)
Dept: URBAN - METROPOLITAN AREA CLINIC 6 | Facility: CLINIC | Age: 48
End: 2023-01-19

## 2023-01-19 DIAGNOSIS — Z96.1: ICD-10-CM

## 2023-01-19 DIAGNOSIS — H26.493: ICD-10-CM

## 2023-01-19 PROCEDURE — 99024 POSTOP FOLLOW-UP VISIT: CPT

## 2023-01-19 ASSESSMENT — TONOMETRY
OS_IOP_MMHG: 13
OD_IOP_MMHG: 12

## 2023-01-19 ASSESSMENT — VISUAL ACUITY
OD_SC: 20/25-2
OS_SC: 20/30

## 2023-04-27 DIAGNOSIS — Z12.11 SCREEN FOR COLON CANCER: Primary | ICD-10-CM

## 2023-06-06 ENCOUNTER — ANESTHESIA (OUTPATIENT)
Dept: ANESTHESIOLOGY | Facility: HOSPITAL | Age: 48
End: 2023-06-06

## 2023-06-06 ENCOUNTER — ANESTHESIA EVENT (OUTPATIENT)
Dept: ANESTHESIOLOGY | Facility: HOSPITAL | Age: 48
End: 2023-06-06

## 2023-06-06 ENCOUNTER — ANESTHESIA EVENT (OUTPATIENT)
Dept: GASTROENTEROLOGY | Facility: AMBULARY SURGERY CENTER | Age: 48
End: 2023-06-06

## 2023-06-06 NOTE — ANESTHESIA PREPROCEDURE EVALUATION
Procedure:  PRE-OP ONLY    Relevant Problems   GYN   (+) Breast cancer (HCC)      PONV      Physical Exam    Airway    Mallampati score: II  TM Distance: >3 FB  Neck ROM: full     Dental   No notable dental hx     Cardiovascular  Cardiovascular exam normal    Pulmonary  Pulmonary exam normal     Other Findings        Anesthesia Plan  ASA Score- 2     Anesthesia Type- IV sedation with anesthesia with ASA Monitors  Additional Monitors:   Airway Plan:           Plan Factors-Exercise tolerance (METS): >4 METS  Chart reviewed  EKG reviewed  Imaging results reviewed  Existing labs reviewed  Patient summary reviewed  Patient is not a current smoker  Patient not instructed to abstain from smoking on day of procedure  Patient did not smoke on day of surgery  There is medical exclusion for perioperative obstructive sleep apnea risk education  Induction- intravenous  Postoperative Plan-     Informed Consent- Anesthetic plan and risks discussed with patient  I personally reviewed this patient with the CRNA  Discussed and agreed on the Anesthesia Plan with the CRNA  Jenny Ramírez

## 2023-06-07 ENCOUNTER — ANESTHESIA (OUTPATIENT)
Dept: GASTROENTEROLOGY | Facility: AMBULARY SURGERY CENTER | Age: 48
End: 2023-06-07

## 2023-06-07 ENCOUNTER — HOSPITAL ENCOUNTER (OUTPATIENT)
Dept: GASTROENTEROLOGY | Facility: AMBULARY SURGERY CENTER | Age: 48
Setting detail: OUTPATIENT SURGERY
Discharge: HOME | End: 2023-06-07
Attending: INTERNAL MEDICINE | Admitting: INTERNAL MEDICINE
Payer: COMMERCIAL

## 2023-06-07 VITALS
WEIGHT: 169 LBS | SYSTOLIC BLOOD PRESSURE: 126 MMHG | RESPIRATION RATE: 18 BRPM | HEART RATE: 76 BPM | HEIGHT: 65 IN | DIASTOLIC BLOOD PRESSURE: 77 MMHG | BODY MASS INDEX: 28.16 KG/M2 | TEMPERATURE: 97.2 F | OXYGEN SATURATION: 99 %

## 2023-06-07 DIAGNOSIS — Z15.01 BRCA1 GENE MUTATION POSITIVE: Primary | ICD-10-CM

## 2023-06-07 DIAGNOSIS — Z15.09 BRCA1 GENE MUTATION POSITIVE: Primary | ICD-10-CM

## 2023-06-07 DIAGNOSIS — Z80.0 FAMILY HISTORY OF COLON CANCER IN MOTHER: ICD-10-CM

## 2023-06-07 DIAGNOSIS — Z12.11 SCREENING FOR COLON CANCER: ICD-10-CM

## 2023-06-07 PROCEDURE — 88305 TISSUE EXAM BY PATHOLOGIST: CPT | Performed by: PATHOLOGY

## 2023-06-07 RX ORDER — SODIUM CHLORIDE, SODIUM LACTATE, POTASSIUM CHLORIDE, CALCIUM CHLORIDE 600; 310; 30; 20 MG/100ML; MG/100ML; MG/100ML; MG/100ML
INJECTION, SOLUTION INTRAVENOUS CONTINUOUS PRN
Status: DISCONTINUED | OUTPATIENT
Start: 2023-06-07 | End: 2023-06-07

## 2023-06-07 RX ORDER — PROPOFOL 10 MG/ML
INJECTION, EMULSION INTRAVENOUS AS NEEDED
Status: DISCONTINUED | OUTPATIENT
Start: 2023-06-07 | End: 2023-06-07

## 2023-06-07 RX ADMIN — PROPOFOL 100 MG: 10 INJECTION, EMULSION INTRAVENOUS at 12:12

## 2023-06-07 RX ADMIN — PROPOFOL 50 MG: 10 INJECTION, EMULSION INTRAVENOUS at 12:14

## 2023-06-07 RX ADMIN — PROPOFOL 50 MG: 10 INJECTION, EMULSION INTRAVENOUS at 12:18

## 2023-06-07 RX ADMIN — PROPOFOL 50 MG: 10 INJECTION, EMULSION INTRAVENOUS at 12:30

## 2023-06-07 RX ADMIN — PROPOFOL 50 MG: 10 INJECTION, EMULSION INTRAVENOUS at 12:22

## 2023-06-07 RX ADMIN — Medication 40 MG: at 12:18

## 2023-06-07 RX ADMIN — PROPOFOL 50 MG: 10 INJECTION, EMULSION INTRAVENOUS at 12:26

## 2023-06-07 RX ADMIN — SODIUM CHLORIDE, SODIUM LACTATE, POTASSIUM CHLORIDE, AND CALCIUM CHLORIDE: .6; .31; .03; .02 INJECTION, SOLUTION INTRAVENOUS at 12:00

## 2023-06-07 RX ADMIN — PROPOFOL 50 MG: 10 INJECTION, EMULSION INTRAVENOUS at 12:16

## 2023-06-07 NOTE — H&P
History and Physical - [facility]  Bandar Canales 41 y o  female MRN: [id number]    HPI: Bandar Canales is a 41y o  year old female who presents for screening colonoscopy      Review of Systems    Historical Information   Past Medical History:   Diagnosis Date   • Cancer ([address] )    • History of chemotherapy    • History of radiation therapy    • PONV (postoperative nausea and vomiting)      Past Surgical History:   Procedure Laterality Date   • BREAST SURGERY      Lumpectomy   • CYSTOSCOPY N/A 07/21/2020    Procedure: CYSTOSCOPY;  Surgeon: Bandar Danielle MD;  [facility];  Service: Gynecology Oncology   • CYSTOSCOPY     • HYSTERECTOMY     • IR PORT PLACEMENT      & removal    • LAPAROTOMY N/A 07/21/2020    Procedure: MINI  LAPAROTOMY FOR RETRIEVAL OF SPECIMAN;  Surgeon: Bandar Danielle MD;  [facility];  Service: Gynecology Oncology   • MASTECTOMY      b/l   • WI LAPS TOTAL HYSTERECT 250 GM/< W/RMVL TUBE/OVARY N/A 07/21/2020    Procedure: ROBOTIC TOTAL LAPAROSCOPIC HYSTERECTOMY, BILATERAL SALPINGO-OOPHORECTOMY;  Surgeon: Bandar Danielle MD;  [facility];  Service: Gynecology Oncology     Social History   Social History     Substance and Sexual Activity   Alcohol Use Yes    Comment: Socially     Social History     Substance and Sexual Activity   Drug Use Yes   • Types: Marijuana    Comment: 5 days / week: Medical     Social History     Tobacco Use   Smoking Status Never   Smokeless Tobacco Never     Family History   Problem Relation Age of Onset   • Colon cancer Mother    • Cancer Father    • Atrial fibrillation Father    • No Known Problems Sister    • No Known Problems Brother    • Alcohol abuse Neg Hx    • Substance Abuse Neg Hx    • Mental illness Neg Hx        Meds/Allergies     (Not in a hospital admission)      Allergies   Allergen Reactions   • Penicillins Hives   • Iodine - Food Allergy Hives       Objective     /83   Pulse 75   Temp (!) 97 1 °F (36 2 "°C) (Temporal)   Resp 18   Ht 5' 5\" (1 651 m)   Wt 76 7 kg (169 lb)   LMP 11/08/2018   SpO2 99%   BMI 28 12 kg/m²       PHYSICAL EXAM    Gen: NAD  CV: RRR  CHEST: Clear  ABD: soft, NT/ND  EXT: no edema  Neuro: AAO      ASSESSMENT/PLAN:  This is a 41y o  year old female here for screening colonoscopy    PLAN:   Procedure: Colonoscopy with biopsy and possible polypectomy    "

## 2023-06-07 NOTE — ANESTHESIA POSTPROCEDURE EVALUATION
Post-Op Assessment Note    CV Status:  Stable  Pain Score: 0    Pain management: adequate     Mental Status:  Arousable and sleepy   Hydration Status:  Stable   PONV Controlled:  Controlled   Airway Patency:  Patent      Post Op Vitals Reviewed: Yes      Staff: CRNA         No notable events documented      BP   137/78   Temp 97 6   Pulse 65   Resp 14   SpO2 99

## 2023-06-08 ENCOUNTER — TELEPHONE (OUTPATIENT)
Dept: HEMATOLOGY ONCOLOGY | Facility: CLINIC | Age: 48
End: 2023-06-08

## 2023-06-08 NOTE — TELEPHONE ENCOUNTER
I called Glenna to schedule a new patient appointment with the Cancer Risk and Genetics Program       Outcome:  Genetics appointment scheduled for 11/22 230pm     Personal/Family History Related to Appointment:  Family history of colon cancer in mother  BRCA1 gene mutation positive  Fhx of bladder ca (father)   History of Genetic Testing:  Patient reports personal history of genetic testing on [facility] [id number]      Genetics Family History Questionnaire:  I confirmed the patient's e-mail on file as the best e-mail to send an invite link for our genetics family history intake

## 2023-10-09 ENCOUNTER — OFFICE VISIT (OUTPATIENT)
Dept: FAMILY MEDICINE CLINIC | Facility: CLINIC | Age: 48
End: 2023-10-09
Payer: COMMERCIAL

## 2023-10-09 VITALS
BODY MASS INDEX: 29.26 KG/M2 | WEIGHT: 175.6 LBS | HEART RATE: 62 BPM | SYSTOLIC BLOOD PRESSURE: 116 MMHG | RESPIRATION RATE: 16 BRPM | TEMPERATURE: 96.3 F | DIASTOLIC BLOOD PRESSURE: 76 MMHG | HEIGHT: 65 IN | OXYGEN SATURATION: 99 %

## 2023-10-09 DIAGNOSIS — B35.1 TOENAIL FUNGUS: ICD-10-CM

## 2023-10-09 DIAGNOSIS — Z13.228 SCREENING FOR METABOLIC DISORDER: ICD-10-CM

## 2023-10-09 DIAGNOSIS — E04.9 ENLARGED THYROID: ICD-10-CM

## 2023-10-09 DIAGNOSIS — Z13.6 SCREENING FOR CARDIOVASCULAR CONDITION: ICD-10-CM

## 2023-10-09 DIAGNOSIS — C50.912 MALIGNANT NEOPLASM OF BOTH BREASTS IN FEMALE, ESTROGEN RECEPTOR NEGATIVE, UNSPECIFIED SITE OF BREAST (HCC): ICD-10-CM

## 2023-10-09 DIAGNOSIS — E55.9 VITAMIN D DEFICIENCY: ICD-10-CM

## 2023-10-09 DIAGNOSIS — Z13.89 SCREENING FOR BLOOD OR PROTEIN IN URINE: ICD-10-CM

## 2023-10-09 DIAGNOSIS — M72.2 PLANTAR FASCIITIS OF LEFT FOOT: ICD-10-CM

## 2023-10-09 DIAGNOSIS — Z13.220 SCREENING FOR LIPID DISORDERS: ICD-10-CM

## 2023-10-09 DIAGNOSIS — C50.911 MALIGNANT NEOPLASM OF BOTH BREASTS IN FEMALE, ESTROGEN RECEPTOR NEGATIVE, UNSPECIFIED SITE OF BREAST (HCC): ICD-10-CM

## 2023-10-09 DIAGNOSIS — Z00.00 ANNUAL PHYSICAL EXAM: Primary | ICD-10-CM

## 2023-10-09 DIAGNOSIS — Z17.1 MALIGNANT NEOPLASM OF BOTH BREASTS IN FEMALE, ESTROGEN RECEPTOR NEGATIVE, UNSPECIFIED SITE OF BREAST (HCC): ICD-10-CM

## 2023-10-09 PROCEDURE — 99213 OFFICE O/P EST LOW 20 MIN: CPT | Performed by: NURSE PRACTITIONER

## 2023-10-09 PROCEDURE — 99396 PREV VISIT EST AGE 40-64: CPT | Performed by: NURSE PRACTITIONER

## 2023-10-09 NOTE — PROGRESS NOTES
ADULT ANNUAL PHYSICAL  [id number] [facility]    NAME: Glenna Doyle  AGE: 57 y.o. SEX: female  : 1975     DATE: 10/9/2023     Assessment and Plan:  1. Pt declining immunizations today. 2. Labs reordered. 3. Colonoscopy UTD. 4. F/u in 1 year for annual physical or sooner PRN. Problem List Items Addressed This Visit        Other    Breast cancer ([address])   Other Visit Diagnoses     Annual physical exam    -  Primary    Enlarged thyroid        Relevant Orders    TSH, 3rd generation with Free T4 reflex    US thyroid    Thyroid Antibodies Panel    Toenail fungus        Relevant Medications    ciclopirox (PENLAC) 8 % solution    Plantar fasciitis of left foot        Relevant Orders    Ambulatory Referral to Physical Therapy    Screening for lipid disorders        Relevant Orders    Lipid panel    Vitamin D deficiency        Relevant Orders    Vitamin D 25 hydroxy    Screening for blood or protein in urine        Relevant Orders    UA (URINE) with reflex to Scope    Screening for metabolic disorder        Relevant Orders    Comprehensive metabolic panel    Screening for cardiovascular condition        Relevant Orders    CBC and differential          Immunizations and preventive care screenings were discussed with patient today. Appropriate education was printed on patient's after visit summary. Counseling:  Alcohol/drug use: discussed moderation in alcohol intake, the recommendations for healthy alcohol use, and avoidance of illicit drug use. Dental Health: discussed importance of regular tooth brushing, flossing, and dental visits. Injury prevention: discussed safety/seat belts, safety helmets, smoke detectors, carbon dioxide detectors, and smoking near bedding or upholstery.   Sexual health: discussed sexually transmitted diseases, partner selection, use of condoms, avoidance of unintended pregnancy, and contraceptive alternatives. · Exercise: the importance of regular exercise/physical activity was discussed. Recommend exercise 3-5 times per week for at least 30 minutes. BMI Counseling: Body mass index is 29 kg/m². The BMI is above normal. Nutrition recommendations include decreasing portion sizes, encouraging healthy choices of fruits and vegetables, decreasing fast food intake, consuming healthier snacks, limiting drinks that contain sugar, moderation in carbohydrate intake, increasing intake of lean protein, reducing intake of saturated and trans fat and reducing intake of cholesterol. Exercise recommendations include moderate physical activity 150 minutes/week. No pharmacotherapy was ordered. Rationale for BMI follow-up plan is due to patient being overweight or obese. Depression Screening and Follow-up Plan: Patient was screened for depression during today's encounter. They screened negative with a PHQ-2 score of 0. Return for Annual physical.     Chief Complaint:     Chief Complaint   Patient presents with   • Physical Exam      History of Present Illness:     Adult Annual Physical   Patient here for a comprehensive physical exam. The patient reports no problems. Diet and Physical Activity  · Diet/Nutrition: well balanced diet, consuming 3-5 servings of fruits/vegetables daily and adequate fiber intake. · Exercise: 5-7 times a week on average and 30-60 minutes on average. Depression Screening  PHQ-2/9 Depression Screening    Little interest or pleasure in doing things: 0 - not at all  Feeling down, depressed, or hopeless: 0 - not at all  PHQ-2 Score: 0  PHQ-2 Interpretation: Negative depression screen       General Health  · Sleep: sleeps well and gets 7-8 hours of sleep on average. · Hearing: normal - bilateral.  · Vision: no vision problems. · Dental: regular dental visits, brushes teeth twice daily and flosses teeth occasionally.             Review of Systems:     Review of Systems Constitutional: Negative. Negative for chills and fatigue. HENT: Negative. Respiratory: Negative. Negative for cough and shortness of breath. Cardiovascular: Negative. Negative for chest pain. Gastrointestinal: Negative. Genitourinary: Negative. Musculoskeletal: Negative. Negative for myalgias. Neurological: Negative.        Past Medical History:     Past Medical History:   Diagnosis Date   • Cancer ([address])    • History of chemotherapy    • History of radiation therapy    • PONV (postoperative nausea and vomiting)       Past Surgical History:     Past Surgical History:   Procedure Laterality Date   • BREAST SURGERY      Lumpectomy   • CYSTOSCOPY N/A 07/21/2020    Procedure: CYSTOSCOPY;  Surgeon: Abbey Garcia MD;  [facility];  Service: Gynecology Oncology   • CYSTOSCOPY     • HYSTERECTOMY     • IR PORT PLACEMENT      & removal    • LAPAROTOMY N/A 07/21/2020    Procedure: MINI  LAPAROTOMY FOR RETRIEVAL OF SPECIMAN;  Surgeon: Abbey Garcia MD;  [facility];  Service: Gynecology Oncology   • MASTECTOMY      b/l   • PA LAPS TOTAL HYSTERECT 250 GM/< W/RMVL TUBE/OVARY N/A 07/21/2020    Procedure: ROBOTIC TOTAL LAPAROSCOPIC HYSTERECTOMY, BILATERAL SALPINGO-OOPHORECTOMY;  Surgeon: Abbey Garcia MD;  [facility];  Service: Gynecology Oncology      Social History:     Social History     Socioeconomic History   • Marital status: /Civil Union     Spouse name: None   • Number of children: None   • Years of education: None   • Highest education level: None   Occupational History   • None   Tobacco Use   • Smoking status: Never   • Smokeless tobacco: Never   Vaping Use   • Vaping Use: Never used   Substance and Sexual Activity   • Alcohol use: Yes     Comment: Socially   • Drug use: Yes     Types: Marijuana     Comment: 5 days / week: Medical   • Sexual activity: None   Other Topics Concern   • None   Social History Narrative    Always uses seat belt    Has smoke detectors     Social Determinants of Health     Financial Resource Strain: Not on file   Food Insecurity: Not on file   Transportation Needs: Not on file   Physical Activity: Not on file   Stress: Not on file   Social Connections: Not on file   Intimate Partner Violence: Not on file   Housing Stability: Not on file      Family History:     Family History   Problem Relation Age of Onset   • Colon cancer Mother    • Cancer Father    • Atrial fibrillation Father    • Other Father         pacemaker   • No Known Problems Sister    • No Known Problems Brother    • Alcohol abuse Neg Hx    • Substance Abuse Neg Hx    • Mental illness Neg Hx       Current Medications:     Current Outpatient Medications   Medication Sig Dispense Refill   • ciclopirox (PENLAC) 8 % solution Apply topically daily at bedtime 6 mL 3   • Multiple Vitamin (MULTIVITAMIN) tablet Take 1 tablet by mouth daily     • Omega-3 Fatty Acids (fish oil) 1,000 mg Take 1,000 mg by mouth daily       No current facility-administered medications for this visit. Allergies: Allergies   Allergen Reactions   • Penicillins Hives   • Iodine - Food Allergy Hives      Physical Exam:     /76   Pulse 62   Temp (!) 96.3 °F (35.7 °C)   Resp 16   Ht 5' 5.25" (1.657 m)   Wt 79.7 kg (175 lb 9.6 oz)   LMP 11/08/2018   SpO2 99%   BMI 29.00 kg/m²     Physical Exam  Vitals and nursing note reviewed. Constitutional:       General: She is not in acute distress. Appearance: Normal appearance. She is well-developed. She is not ill-appearing. HENT:      Head: Normocephalic and atraumatic. Right Ear: Tympanic membrane, ear canal and external ear normal.      Left Ear: Tympanic membrane, ear canal and external ear normal.   Eyes:      Conjunctiva/sclera: Conjunctivae normal.   Neck:      Thyroid: Thyromegaly present. No thyroid tenderness. Vascular: No carotid bruit. Cardiovascular:      Rate and Rhythm: Normal rate and regular rhythm.       Pulses: Normal pulses. Heart sounds: Normal heart sounds. No murmur heard. Pulmonary:      Effort: Pulmonary effort is normal. No respiratory distress. Breath sounds: Normal breath sounds. No wheezing. Abdominal:      General: There is no distension. Palpations: Abdomen is soft. There is no mass. Tenderness: There is no abdominal tenderness. There is no guarding or rebound. Hernia: No hernia is present. Musculoskeletal:         General: Normal range of motion. Cervical back: Normal range of motion and neck supple. Right lower leg: No edema. Left lower leg: No edema. Skin:     General: Skin is warm and dry. Capillary Refill: Capillary refill takes less than 2 seconds. Neurological:      Mental Status: She is alert and oriented to person, place, and time. Mental status is at baseline. Motor: No weakness. Gait: Gait normal.   Psychiatric:         Mood and Affect: Mood normal.         Behavior: Behavior normal.         Thought Content:  Thought content normal.         Judgment: Judgment normal.          Jeniffer Serrano, [address] [facility]

## 2023-10-09 NOTE — PROGRESS NOTES
Assessment/Plan:     Diagnoses and all orders for this visit:    Enlarged thyroid  -     TSH, 3rd generation with Free T4 reflex; Future  -     US thyroid; Future  -     Thyroid Antibodies Panel; Future    Labs and u/s ordered. We will contact pt w/ results once available. Patient is encouraged to call our office for any questions/concerns, persistent or worsening symptoms. Patient states they understand and agree with treatment plan. Toenail fungus  -     ciclopirox (PENLAC) 8 % solution; Apply topically daily at bedtime    Penlac ordered. Medication reviewed. Patient is encouraged to call our office for any questions/concerns, persistent or worsening symptoms. Patient states they understand and agree with treatment plan. Plantar fasciitis of left foot  -     Ambulatory Referral to Physical Therapy; Future    Pt to continue wearing supportive footwear. Pt to freeze water bottle and roll under foot for 10-15 minutes each night. PT referral placed. Patient is encouraged to call our office for any questions/concerns, persistent or worsening symptoms. Patient states they understand and agree with treatment plan. Screening for lipid disorders  -     Lipid panel; Future    Vitamin D deficiency  -     Vitamin D 25 hydroxy; Future    Screening for blood or protein in urine  -     UA (URINE) with reflex to Scope    Screening for metabolic disorder  -     Comprehensive metabolic panel; Future    Malignant neoplasm of both breasts in female, estrogen receptor negative, unspecified site of breast ([address])    Pt had hx of jay mastectomy in the past.    Screening for cardiovascular condition  -     CBC and differential; Future          Pt to f/u PRN. F/u pending results. Subjective:      Patient ID: Irwin Georges is a 57 y.o. female.     Pt here today for her annual physical.  She did have her colonoscopy over the summer and is meeting w/ genetic counselor this fall since her mother has hx of colon CA and patient had hx of breast CA. Pt has not yet done her labs or thyroid u/s from last year for thyromegaly. Pt also would like treatment for her toenail fungus. She also has issues w/ plantar fasciitis to her left foot. The following portions of the patient's history were reviewed and updated as appropriate: allergies, current medications, past family history, past medical history, past social history, past surgical history and problem list.    Review of Systems    As noted per HPI. Objective:      /76   Pulse 62   Temp (!) 96.3 °F (35.7 °C)   Resp 16   Ht 5' 5.25" (1.657 m)   Wt 79.7 kg (175 lb 9.6 oz)   LMP 11/08/2018   SpO2 99%   BMI 29.00 kg/m²          Physical Exam  Vitals reviewed. Constitutional:       General: She is not in acute distress. Appearance: Normal appearance. She is not ill-appearing. HENT:      Head: Normocephalic. Right Ear: Tympanic membrane, ear canal and external ear normal.      Left Ear: Tympanic membrane, ear canal and external ear normal.   Neck:      Thyroid: Thyromegaly present. No thyroid tenderness. Cardiovascular:      Rate and Rhythm: Normal rate and regular rhythm. Pulses: Normal pulses. Heart sounds: Normal heart sounds. No murmur heard. Pulmonary:      Effort: Pulmonary effort is normal. No respiratory distress. Breath sounds: Normal breath sounds. No wheezing. Abdominal:      General: There is no distension. Palpations: Abdomen is soft. There is no mass. Tenderness: There is no abdominal tenderness. There is no guarding or rebound. Hernia: No hernia is present. Musculoskeletal:         General: Normal range of motion. Right lower leg: No edema. Left lower leg: No edema. Skin:     General: Skin is warm and dry. Neurological:      Mental Status: She is alert and oriented to person, place, and time. Mental status is at baseline.    Psychiatric:         Mood and Affect: Mood normal.         Behavior: Behavior normal.         Thought Content:  Thought content normal.         Judgment: Judgment normal.

## 2023-11-16 ENCOUNTER — TELEPHONE (OUTPATIENT)
Dept: GENETICS | Facility: CLINIC | Age: 48
End: 2023-11-16

## 2023-11-22 ENCOUNTER — CLINICAL SUPPORT (OUTPATIENT)
Dept: GENETICS | Facility: CLINIC | Age: 48
End: 2023-11-22
Payer: COMMERCIAL

## 2023-11-22 ENCOUNTER — DOCUMENTATION (OUTPATIENT)
Dept: GENETICS | Facility: CLINIC | Age: 48
End: 2023-11-22

## 2023-11-22 DIAGNOSIS — C50.911 MALIGNANT NEOPLASM OF BOTH BREASTS IN FEMALE, ESTROGEN RECEPTOR NEGATIVE, UNSPECIFIED SITE OF BREAST (HCC): Primary | ICD-10-CM

## 2023-11-22 DIAGNOSIS — Z15.09 BRCA1 GENE MUTATION POSITIVE: ICD-10-CM

## 2023-11-22 DIAGNOSIS — C50.912 MALIGNANT NEOPLASM OF BOTH BREASTS IN FEMALE, ESTROGEN RECEPTOR NEGATIVE, UNSPECIFIED SITE OF BREAST (HCC): Primary | ICD-10-CM

## 2023-11-22 DIAGNOSIS — Z17.1 MALIGNANT NEOPLASM OF BOTH BREASTS IN FEMALE, ESTROGEN RECEPTOR NEGATIVE, UNSPECIFIED SITE OF BREAST (HCC): Primary | ICD-10-CM

## 2023-11-22 DIAGNOSIS — Z15.01 BRCA1 GENE MUTATION POSITIVE: ICD-10-CM

## 2023-11-22 DIAGNOSIS — Z80.0 FAMILY HISTORY OF COLON CANCER IN MOTHER: ICD-10-CM

## 2023-11-22 PROCEDURE — 36415 COLL VENOUS BLD VENIPUNCTURE: CPT

## 2023-11-22 NOTE — PROGRESS NOTES
Pre-Test Genetic Counseling Consult Note    Patient Name: Shruti Tejada   /Age: 1975/48 y.o. Referring Provider: Mor Goetz MD     Date of Service: 2023  Genetic Counselor: Tavo Vásquez, [id number] [facility], [address] [facility] Counseling  Hemanth Patel   Interpretation Services: None  [phone] [facility] was referred to the [address] [place] [address] Floor and Genetic Assessment Program due to her personal history of bilateral triple negative breast cancer at ages 24 and 30. She is also known to have a BRCA1 mutation. She also has a family history of melanoma, ureter and colon cancer. She presents today to discuss the possibility of a hereditary cancer syndrome, options for genetic testing, and implications for her and her family. Genetic Testing History:  Report Date: 2006  Lab: [facility]  Test: Comprehensive BRACAnalysis (BRCA1 & BRCA2 sequencing & BRCA1 5-site rearrangement panel)   Result: Positive BRCA1 c.5193G>C (p.Q1737Z); Heterozygous; Suspected Deleterious      Cancer History and Treatment:     Personal History: Personal history of triple negative breast cancer; right breast cancer at age 24 followed by left breast cancer at age 30. She had a bilateral mastectomy.       Screening Hx:     Breast: Bilateral mastectomy     Colon:  Colonoscopy: Most recent 23; 1 polyp; recommended to repeat in 5 years     Gynecologic:  JASSI/BSO 2020    Skin:  No current screening    Reproductive History: Not assessed     Medical and Surgical History  Pertinent surgical history:   Past Surgical History:   Procedure Laterality Date    BREAST SURGERY      Lumpectomy    CYSTOSCOPY N/A 2020    Procedure: Donnal Level;  Surgeon: Amy Cabezas MD;  Location: [facility];  Service: Gynecology Oncology    CYSTOSCOPY      HYSTERECTOMY      IR PORT PLACEMENT      & removal     LAPAROTOMY N/A 2020    Procedure: MINI  LAPAROTOMY FOR RETRIEVAL OF SPECIMAN;  Surgeon: Jeanette Joya MD;  Location: [facility];  Service: Gynecology Oncology    MASTECTOMY      b/l    ND LAPS TOTAL HYSTERECT 250 GM/< W/RMVL TUBE/OVARY N/A 07/21/2020    Procedure: ROBOTIC TOTAL LAPAROSCOPIC HYSTERECTOMY, BILATERAL SALPINGO-OOPHORECTOMY;  Surgeon: Jeanette Joya MD;  Location: [facility];  Service: Gynecology Oncology      Pertinent medical history:  Past Medical History:   Diagnosis Date    Cancer ([address])     History of chemotherapy     History of radiation therapy     PONV (postoperative nausea and vomiting)        Other History:  Height:   Ht Readings from Last 1 Encounters:   10/09/23 5' 5.25" (1.657 m)     Weight:   Wt Readings from Last 1 Encounters:   10/09/23 79.7 kg (175 lb 9.6 oz)     Relevant Family History   Patient reports paternal Ashkenazi Jainism ancestry. Sister (age 43): no history of cancer and reportedly tested negative for the familial BRCA1 mutation  Brother (age 48) with no history of cancer and reportedly has not had genetic testing    Maternal Family History: Mother (d age 64) with colon cancer at age 58  Aunt (d age ?) with multiple myeloma diagnosed in her late 42's  Grandfather (d age 66's) with colon cancer in his 66's  Grandmother (d age ?) with an unknown type of cancer    Paternal Family History:  Father (age 68) with transitional cell cancer of the ureter; Glenna reports that her father had genetic testing and carries the familial BRCA1 mutation  Uncle (age 67) with skin cancer  Uncle (d age ?) with Down syndrome  Grandmother (d age 66's) with melanoma    Please refer to the scanned pedigree in the Media Tab for a complete family history     *All history is reported as provided by the patient; records are not available for review, except where indicated. Assessment:    We reviewed Glenna's prior genetic test result. Beto Salazar carries a mutation in the BRCA1 gene, specifically BRCA1 c.5193G>C (p.D5515D).  The BRCA1 gene is associated with autosomal dominant hereditary breast and ovarian cancer (HBOC) syndrome. Women with a single BRCA1 pathogenic variant have approximately a 40-87% lifetime risk of breast cancer. Women also have up to a 16-59% lifetime risk for ovarian, fallopian tube, or peritoneal cancer to age 79. Men with a BRCA1 pathogenic variant have up to a .2-1.2% risk for breast cancer and up to a 7-26% risk for prostate cancer. Men and women also have an elevated risk for melanoma and up to a ?5% risk for pancreatic cancer. We discussed that Glenna's son has a 50% chance to inherit this BRCA1 pathogenic variant however testing is not recommended for children under the age of 25, as there are no childhood cancer risks known to be associated with a single pathogenic variant in this gene. Management guidelines for individuals with BRCA1 mutations have been developed by the [facility]. Please refer to the current NCCN guidelines for the most up to date guidelines. The recommendations listed below are specific to [facility] and are are based on recommendations in the V2.2024 The Genetic/Familial High-Risk Assessment: Breast, Ovarian and Pancreatic Guideline. These recommendations are subject to change over time and the newest guidelines should be referenced for the most up to date recommendations. We reviewed the current NCCN BRCA1 management recommendations. Glenna already has a bilateral mastectomy and JASSI/BSO. So we discussed the following management recommendations:      Skin Cancer Screening  -No specific screening guidelines exist for melanoma, but general melanoma risk management is appropriate, such as annual full-body skin examination with a dermatologist and minimizing UV exposure.     Pancreatic Cancer Screening:  Current NCCN Guidelines recommend pancreatic cancer screening for individuals with an BRCA1 pathogenic variant and a first- or second-degree relative with exocrine pancreatic cancer from the same side of the family as the identified pathogenic/likely pathogenic germline variant. Pancreatic cancer screening typically begins at age 48 (or 8 years younger than the earliest exocrine pancreatic cancer diagnosis in the family, whichever is earlier) and includes contrast-enhanced MRI/MRCP (magnetic resonance cholangiopancreatography) and/or endoscopic ultrasound (EU). Currently there is no known family history of pancreatic cancer therefore Glenna does not meet NCCN guidelines for pancreatic screening. However, we discussed that the ASGE guideline on screening for pancreatic cancer recommends screening for BRCA1 and BRCA2 mutation carriers regardless of family history. If Osiris Fung is interested in meeting with our high risk pancreatic cancer screening she can call [facility] at [phone]. Update Testing: We discussed the availability of update genetic testing for Glenna based on her personal history of breast cancer under the age of 27 along with the family history of colorectal cancer. We recommended update testing based on the following: The current NCCN testing criteria recommend testing for the BRCA1, BRCA2, CDH1, PALB2, PTEN and TP53 genes. Glenna's prior test did not include the CDH1, PALB2, PTEN and TP53 genes therefore it is reasonable for her to consider update testing. In addition, she did not have testing for the moderate risk breast cancer genes such as ALAN and CHEK2 which have clear management recommendations. Glenna Meets NCCN Q1.9571 Testing Criteria for Li-Fraumeni syndrome: Personal history of breast cancer under the age of 27    There is a maternal family history of colorectal cancer and Glenna was not evaluated for colorectal cancer genes.       The risks, benefits, and limitations of genetic testing were reviewed with the patient, as well as genetic discrimination laws, and possible test results (positive, negative, variants of uncertain significance) and their clinical implications. If positive for a mutation, options for managing cancer risk including increased surveillance, chemoprevention, and in some cases prophylactic surgery were discussed. Hollis Laboy was informed that if a hereditary cancer syndrome was identified in her, first degree relatives (parents, siblings, and children) have a chance of also inheriting the condition. Genetic testing would allow for predictive genetic testing in other relatives, who may also be at risk depending on their degree of relation. Billing:  Most individuals pay <$100 for hereditary cancer genetic testing. If insurance covers the cost of the testing, individuals may still pay out of pocket secondary to co-pays, co-insurance, or deductibles. If the cost of the testing exceeds $100, the lab will reach out to the patient via phone or e-mail. The patient will then have the option to proceed with the testing, cancel the testing, or elect the self-pay option of $250. Glenna verbalized understanding. Plan: Patient decided to proceed with testing and provided consent.     Summary:     Sample Collection:  The patient's blood sample was drawn in the office on 11/22/23 by medical assistant Jelly Mena    Genetic Testing Preformed: CustomNext: Cancer® +RNAinsight® (61 genes): APC, ALAN, AXIN2, BAP1, BARD1, BMPR1A, BRCA1, BRCA2, BRIP1, CDH1, CDK4, CDKN1B, CDKN2A, CHEK2, CTNNA1, DICER1, EGLN1, EPCAM, FH, FLCN, GREM1, HOXB13, KIF1B, KIT, MAX, MEN1, MET, MITF, MLH1, MSH2, MSH3, MSH6, MUTYH, NBN, NF1, NTHL1, PALB2, PMS2, POLD1, POLE, POT1, PTEN, RAD50, RAD51C, RAD51D, RB1, RECQL, RET, SDHA, SDHAF2, SDHB, SDHC, SDHD, SMAD4, SMARCA4, STK11, WQUG036, TP53, TSC1, TSC2, VHL      Result Call Information:  In the event that we need to reach Hollis Laboy via telephone:  I confirmed the patient's mobile number on file as the best number to call with results [phone]  I confirmed with the patient that we can leave a voicemail on the provided numbers    Results take approximately 2-3 weeks to complete once test is started. Hankkameron Timmy will be notified via [employer] once results are available. Additional recommendations for surveillance/medical management will be made pending genetic test results.

## 2023-12-12 ENCOUNTER — TELEPHONE (OUTPATIENT)
Dept: GENETICS | Facility: CLINIC | Age: 48
End: 2023-12-12

## 2023-12-12 NOTE — TELEPHONE ENCOUNTER
Post-Test Genetic Counseling Consult Note  Today I spoke with Glenna over the phone to review the results of her genetic test for hereditary cancer. We met previously on 11/22/23 for pre-test counseling. SUMMARY:    Test(s): CustomNext: Cancer® +RNAinsight® (61 genes): APC, ALAN, AXIN2, BAP1, BARD1, BMPR1A, BRCA1, BRCA2, BRIP1, CDH1, CDK4, CDKN1B, CDKN2A, CHEK2, CTNNA1, DICER1, EGLN1, EPCAM, FH, FLCN, GREM1, HOXB13, KIF1B, KIT, MAX, MEN1, MET, MITF, MLH1, MSH2, MSH3, MSH6, MUTYH, NBN, NF1, NTHL1, PALB2, PMS2, POLD1, POLE, POT1, PTEN, RAD50, RAD51C, RAD51D, RB1, RECQL, RET, SDHA, SDHAF2, SDHB, SDHC, SDHD, SMAD4, SMARCA4, STK11, PVSP303, TP53, TSC1, TSC2, VHL       Result: Positive- BRCA1 c.5074G>C (p.S0591F), heterozygous, pathogenic     COMMENT: The BRCA1 "A4962O (5193G>C)" variant, which was previously identified by [employer] in 2006, was also detected by this assay. This variant is designated as BRCA1 p.L6540C (c.5074G>C) by Surendra Pfeiffer    COMMENT: No additional pathogenic mutations, variants of unknown significance, or gross deletions or duplications were detected in any of the other 60 genes analyzed. Assessment:   Pedro Covarrubias carries one pathogenic variant in the BRCA1 gene. The BRCA1 gene is associated with autosomal dominant hereditary breast and ovarian cancer (HBOC) syndrome. Women with a single BRCA1 pathogenic variant have approximately a 40-87% lifetime risk of breast cancer. Women also have up to a 16-59% lifetime risk for ovarian, fallopian tube, or peritoneal cancer to age 79. Men with a BRCA1 pathogenic variant have up to a .2-1.2% risk for breast cancer and up to a 7-26% risk for prostate cancer. Men and women also have an elevated risk for melanoma and up to a ?5% risk for pancreatic cancer.       We discussed that Glenna's son has a 50% chance to inherit this BRCA1 pathogenic variant however testing is not recommended for children under the age of 25, as there are no childhood cancer risks known to be associated with a single pathogenic variant in this gene. Management guidelines for individuals with BRCA1 mutations have been developed by the [facility]. Please refer to the current NCCN guidelines for the most up to date guidelines. The recommendations listed below are specific to [facility] and are are based on recommendations in the V2.2024 The Genetic/Familial High-Risk Assessment: Breast, Ovarian and Pancreatic Guideline. These recommendations are subject to change over time and the newest guidelines should be referenced for the most up to date recommendations. We reviewed the current NCCN BRCA1 management recommendations. Glenna already has a bilateral mastectomy and JASSI/BSO. So we discussed the following management recommendations:       Skin Cancer Screening  -No specific screening guidelines exist for melanoma, but general melanoma risk management is appropriate, such as annual full-body skin examination with a dermatologist and minimizing UV exposure. Pancreatic Cancer Screening:  Current NCCN Guidelines recommend pancreatic cancer screening for individuals with an BRCA1 pathogenic variant and a first- or second-degree relative with exocrine pancreatic cancer from the same side of the family as the identified pathogenic/likely pathogenic germline variant. Pancreatic cancer screening typically begins at age 48 (or 8 years younger than the earliest exocrine pancreatic cancer diagnosis in the family, whichever is earlier) and includes contrast-enhanced MRI/MRCP (magnetic resonance cholangiopancreatography) and/or endoscopic ultrasound (EU). Currently there is no known family history of pancreatic cancer therefore Glenna does not meet NCCN guidelines for pancreatic screening.   However, we discussed that the ASGE guideline on screening for pancreatic cancer recommends screening for BRCA1 and BRCA2 mutation carriers regardless of family history. If Evan Anders is interested in meeting with our high risk pancreatic cancer screening she can call [facility] at [phone]. Other Screening:    NCCN Colorectal Cancer Screening V1.2023    ? First-degree relative with colorectal cancer at any age  Screening colonoscopy beginning at age 36 with follow-up colonoscopy every 5 years unless more frequent screening is clinically indicated     Summary:     Positive Result: Evan Anders was strongly encouraged to follow up on with our office on an annual basis to review the most up to date guidelines as recommendations are subject to change over time.

## 2024-01-10 ENCOUNTER — ESTABLISHED COMPREHENSIVE EXAM (OUTPATIENT)
Dept: URBAN - METROPOLITAN AREA CLINIC 6 | Facility: CLINIC | Age: 49
End: 2024-01-10

## 2024-01-10 DIAGNOSIS — H26.493: ICD-10-CM

## 2024-01-10 PROCEDURE — 92014 COMPRE OPH EXAM EST PT 1/>: CPT

## 2024-01-10 ASSESSMENT — VISUAL ACUITY
OD_PH: 20/20-1
OU_SC: J2
OD_SC: 20/40+1
OS_SC: 20/25-1

## 2024-01-10 ASSESSMENT — TONOMETRY
OS_IOP_MMHG: 13
OD_IOP_MMHG: 16

## 2024-06-13 DIAGNOSIS — B35.1 TOENAIL FUNGUS: ICD-10-CM

## 2024-06-13 RX ORDER — CICLOPIROX 80 MG/ML
SOLUTION TOPICAL
Qty: 6 ML | Refills: 0 | Status: SHIPPED | OUTPATIENT
Start: 2024-06-13

## 2024-06-21 ENCOUNTER — OFFICE VISIT (OUTPATIENT)
Dept: FAMILY MEDICINE CLINIC | Facility: CLINIC | Age: 49
End: 2024-06-21
Payer: COMMERCIAL

## 2024-06-21 VITALS
HEIGHT: 65 IN | BODY MASS INDEX: 29.29 KG/M2 | SYSTOLIC BLOOD PRESSURE: 122 MMHG | HEART RATE: 70 BPM | DIASTOLIC BLOOD PRESSURE: 80 MMHG | WEIGHT: 175.8 LBS | RESPIRATION RATE: 16 BRPM | OXYGEN SATURATION: 99 % | TEMPERATURE: 97.8 F

## 2024-06-21 DIAGNOSIS — B02.9 HERPES ZOSTER WITHOUT COMPLICATION: Primary | ICD-10-CM

## 2024-06-21 PROCEDURE — 99213 OFFICE O/P EST LOW 20 MIN: CPT | Performed by: STUDENT IN AN ORGANIZED HEALTH CARE EDUCATION/TRAINING PROGRAM

## 2024-06-21 RX ORDER — PREDNISONE 10 MG/1
TABLET ORAL
Qty: 20 TABLET | Refills: 0 | Status: SHIPPED | OUTPATIENT
Start: 2024-06-21 | End: 2024-06-29

## 2024-06-21 RX ORDER — VALACYCLOVIR HYDROCHLORIDE 1 G/1
1000 TABLET, FILM COATED ORAL 3 TIMES DAILY
Qty: 21 TABLET | Refills: 0 | Status: SHIPPED | OUTPATIENT
Start: 2024-06-21 | End: 2024-06-28

## 2024-06-21 NOTE — PROGRESS NOTES
Ambulatory Visit  Name: Glenna Doyle      : 1975      MRN: [id number]  Encounter Provider: Oxana Fry MD  Encounter Date: 2024   Encounter department: [facility]    Assessment & Plan   1. Herpes zoster without complication  -     valACYclovir (VALTREX) 1,000 mg tablet; Take 1 tablet (1,000 mg total) by mouth 3 (three) times a day for 7 days  -     predniSONE 10 mg tablet; Take 4 tablets (40 mg total) by mouth daily for 2 days, THEN 3 tablets (30 mg total) daily for 2 days, THEN 2 tablets (20 mg total) daily for 2 days, THEN 1 tablet (10 mg total) daily for 2 days.  Discussed with patient shingles rash, to keep area dry clean and covered.  Especially when around others that may be immunocompromised or pregnant.  Patient does not have any neuropathic pain  Will start patient on Valtrex 1000 mg 3 times a day for 7 days along with prednisone to help with the inflammation and discomfort    Follow-up as needed     History of Present Illness     Glenna is a 48-year-old female who presents to the office today for an acute visit.  Patient reports over the last 2 weeks she has noted some pain and irritation on her right upper back.  She was out trying bathing suit and then gardening so believed she had poison ivy from working out in the yard.  Last Saturday she noted it worsening and also moving around lower right breast.  Patient denies any sick contacts or recent illnesses.  Has been using hydrocortisone on site with minimal relief.  Also has been taking Benadryl.  Denies any history of shingles.  No fever.      Review of Systems   Constitutional:  Negative for appetite change and fever.   HENT:  Negative for congestion.    Respiratory:  Negative for chest tightness.    Gastrointestinal:  Negative for abdominal pain.   Skin:  Positive for rash.   Neurological:  Negative for dizziness, light-headedness, numbness and headaches.     Past Medical History   Past Medical  History:   Diagnosis Date    Cancer (HCC)     History of chemotherapy     History of radiation therapy     PONV (postoperative nausea and vomiting)      Past Surgical History:   Procedure Laterality Date    BREAST SURGERY      Lumpectomy    CYSTOSCOPY N/A 07/21/2020    Procedure: CYSTOSCOPY;  Surgeon: Tdod Hughes MD;  Location: [facility];  Service: Gynecology Oncology    CYSTOSCOPY      HYSTERECTOMY      IR PORT PLACEMENT      & removal     LAPAROTOMY N/A 07/21/2020    Procedure: MINI  LAPAROTOMY FOR RETRIEVAL OF SPECIMAN;  Surgeon: Todd Hughes MD;  Location: [facility];  Service: Gynecology Oncology    MASTECTOMY      b/l    ND LAPS TOTAL HYSTERECT 250 GM/< W/RMVL TUBE/OVARY N/A 07/21/2020    Procedure: ROBOTIC TOTAL LAPAROSCOPIC HYSTERECTOMY, BILATERAL SALPINGO-OOPHORECTOMY;  Surgeon: Todd Hughes MD;  Location: [facility];  Service: Gynecology Oncology     Family History   Problem Relation Age of Onset    Colon cancer Mother     Cancer Father     Atrial fibrillation Father     Other Father         pacemaker    No Known Problems Sister     No Known Problems Brother     Alcohol abuse Neg Hx     Substance Abuse Neg Hx     Mental illness Neg Hx      Current Outpatient Medications on File Prior to Visit   Medication Sig Dispense Refill    Multiple Vitamin (MULTIVITAMIN) tablet Take 1 tablet by mouth daily      ciclopirox (PENLAC) 8 % solution Apply topically daily at bedtime (Patient not taking: Reported on 6/21/2024) 6 mL 0    [DISCONTINUED] Omega-3 Fatty Acids (fish oil) 1,000 mg Take 1,000 mg by mouth daily       No current facility-administered medications on file prior to visit.     Allergies   Allergen Reactions    Penicillins Hives    Iodine - Food Allergy Hives      Current Outpatient Medications on File Prior to Visit   Medication Sig Dispense Refill    Multiple Vitamin (MULTIVITAMIN) tablet Take 1 tablet by mouth daily      ciclopirox (PENLAC) 8 % solution Apply topically daily at bedtime  "(Patient not taking: Reported on 6/21/2024) 6 mL 0    [DISCONTINUED] Omega-3 Fatty Acids (fish oil) 1,000 mg Take 1,000 mg by mouth daily       No current facility-administered medications on file prior to visit.      Social History     Tobacco Use    Smoking status: Never    Smokeless tobacco: Never   Vaping Use    Vaping status: Never Used   Substance and Sexual Activity    Alcohol use: Yes     Alcohol/week: 4.0 standard drinks of alcohol     Types: 4 Shots of liquor per week     Comment: Socially    Drug use: Yes     Frequency: 5.0 times per week     Types: Marijuana     Comment: 5 days / week: Medical    Sexual activity: Yes     Partners: Male     Birth control/protection: Female Sterilization     Objective     /80   Pulse 70   Temp 97.8 °F (36.6 °C)   Resp 16   Ht 5' 5.25\" (1.657 m)   Wt 79.7 kg (175 lb 12.8 oz)   LMP 11/08/2018   SpO2 99%   BMI 29.03 kg/m²     Physical Exam  Eyes:      Extraocular Movements: Extraocular movements intact.   Cardiovascular:      Rate and Rhythm: Normal rate.      Pulses: Normal pulses.   Pulmonary:      Effort: Pulmonary effort is normal.      Breath sounds: Normal breath sounds.   Skin:     Findings: Rash present. Rash is vesicular.          Neurological:      Mental Status: She is alert.       Administrative Statements   I have spent a total time of 20 minutes on 06/21/24 In caring for this patient including Risks and benefits of tx options, Instructions for management, Patient and family education, Importance of tx compliance, Risk factor reductions, Impressions, Counseling / Coordination of care, Documenting in the medical record, Reviewing / ordering tests, medicine, procedures  , and Obtaining or reviewing history  .        "

## 2024-09-23 DIAGNOSIS — B35.1 TOENAIL FUNGUS: ICD-10-CM

## 2024-09-23 RX ORDER — CICLOPIROX 80 MG/ML
SOLUTION TOPICAL
Qty: 6 ML | Refills: 0 | Status: SHIPPED | OUTPATIENT
Start: 2024-09-23

## 2024-10-31 ENCOUNTER — OFFICE VISIT (OUTPATIENT)
Dept: FAMILY MEDICINE CLINIC | Facility: CLINIC | Age: 49
End: 2024-10-31
Payer: COMMERCIAL

## 2024-10-31 VITALS
TEMPERATURE: 96.7 F | HEART RATE: 72 BPM | HEIGHT: 65 IN | RESPIRATION RATE: 14 BRPM | OXYGEN SATURATION: 99 % | BODY MASS INDEX: 28.62 KG/M2 | DIASTOLIC BLOOD PRESSURE: 80 MMHG | WEIGHT: 171.8 LBS | SYSTOLIC BLOOD PRESSURE: 124 MMHG

## 2024-10-31 DIAGNOSIS — Z00.00 ANNUAL PHYSICAL EXAM: Primary | ICD-10-CM

## 2024-10-31 DIAGNOSIS — Z13.220 SCREENING FOR LIPID DISORDERS: ICD-10-CM

## 2024-10-31 DIAGNOSIS — C50.912 MALIGNANT NEOPLASM OF BOTH BREASTS IN FEMALE, ESTROGEN RECEPTOR NEGATIVE, UNSPECIFIED SITE OF BREAST (HCC): ICD-10-CM

## 2024-10-31 DIAGNOSIS — E04.9 ENLARGED THYROID: ICD-10-CM

## 2024-10-31 DIAGNOSIS — Z13.228 SCREENING FOR METABOLIC DISORDER: ICD-10-CM

## 2024-10-31 DIAGNOSIS — C50.911 MALIGNANT NEOPLASM OF BOTH BREASTS IN FEMALE, ESTROGEN RECEPTOR NEGATIVE, UNSPECIFIED SITE OF BREAST (HCC): ICD-10-CM

## 2024-10-31 DIAGNOSIS — B35.1 TOENAIL FUNGUS: ICD-10-CM

## 2024-10-31 DIAGNOSIS — Z13.6 SCREENING FOR CARDIOVASCULAR CONDITION: ICD-10-CM

## 2024-10-31 DIAGNOSIS — Z17.1 MALIGNANT NEOPLASM OF BOTH BREASTS IN FEMALE, ESTROGEN RECEPTOR NEGATIVE, UNSPECIFIED SITE OF BREAST (HCC): ICD-10-CM

## 2024-10-31 PROCEDURE — 99396 PREV VISIT EST AGE 40-64: CPT | Performed by: NURSE PRACTITIONER

## 2024-10-31 NOTE — PROGRESS NOTES
Adult Annual Physical  Name: Glenna Doyle      : 1975      MRN: [id number]  Encounter Provider: YONI Adamson  Encounter Date: 10/31/2024   Encounter department: [facility]    Pt deferring vaccines.  Fasting labs ordered.  Colonoscopy UTD.  F/u in 1 year for annual physical or sooner PRN.  Assessment & Plan  Annual physical exam         Malignant neoplasm of both breasts in female, estrogen receptor negative, unspecified site of breast (HCC)       Hx of jay mastectomy.  Toenail fungus    Orders:    Ambulatory Referral to Podiatry; Future    Pt has tried and failed Penlac solution. Will refer to Podiatry.  Enlarged thyroid    Orders:    TSH, 3rd generation with Free T4 reflex; Future    US thyroid; Future    Check TSH and thyroid ultrasound is reordered.  Screening for lipid disorders    Orders:    Lipid panel; Future    Screening for metabolic disorder    Orders:    Comprehensive metabolic panel; Future    Screening for cardiovascular condition    Orders:    CBC and differential; Future    Immunizations and preventive care screenings were discussed with patient today. Appropriate education was printed on patient's after visit summary.    Counseling:  Alcohol/drug use: discussed moderation in alcohol intake, the recommendations for healthy alcohol use, and avoidance of illicit drug use.  Dental Health: discussed importance of regular tooth brushing, flossing, and dental visits.  Injury prevention: discussed safety/seat belts, safety helmets, smoke detectors, carbon monoxide detectors, and smoking near bedding or upholstery.  Sexual health: discussed sexually transmitted diseases, partner selection, use of condoms, avoidance of unintended pregnancy, and contraceptive alternatives.  Exercise: the importance of regular exercise/physical activity was discussed. Recommend exercise 3-5 times per week for at least 30 minutes.          History of Present  "Illness     Adult Annual Physical:  Patient presents for annual physical.     Diet and Physical Activity:  - Diet/Nutrition: well balanced diet, consuming 3-5 servings of fruits/vegetables daily and adequate fiber intake.  - Exercise: moderate cardiovascular exercise, walking and 3-4 times a week on average.    General Health:  - Sleep: 7-8 hours of sleep on average and sleeps well.  - Hearing: normal hearing bilateral ears.  - Vision: no vision problems. hx of cataract surgery  - Dental: brushes teeth twice daily.    Review of Systems   Constitutional: Negative.  Negative for chills and fatigue.   HENT: Negative.     Respiratory: Negative.  Negative for cough and shortness of breath.    Cardiovascular: Negative.  Negative for chest pain.   Gastrointestinal: Negative.    Genitourinary: Negative.    Musculoskeletal: Negative.  Negative for myalgias.   Neurological: Negative.      Medical History Reviewed by provider this encounter:  Tobacco  Allergies  Meds  Problems  Med Hx  Surg Hx  Fam Hx         Objective     /80   Pulse 72   Temp (!) 96.7 °F (35.9 °C)   Resp 14   Ht 5' 5.25\" (1.657 m)   Wt 77.9 kg (171 lb 12.8 oz)   LMP 11/08/2018   SpO2 99%   BMI 28.37 kg/m²     Physical Exam  Vitals and nursing note reviewed.   Constitutional:       General: She is not in acute distress.     Appearance: Normal appearance. She is well-developed. She is not ill-appearing.   HENT:      Head: Normocephalic and atraumatic.      Right Ear: Tympanic membrane, ear canal and external ear normal.      Left Ear: Tympanic membrane, ear canal and external ear normal.   Eyes:      Conjunctiva/sclera: Conjunctivae normal.   Neck:      Thyroid: Thyromegaly present. No thyroid tenderness.      Vascular: No carotid bruit.   Cardiovascular:      Rate and Rhythm: Normal rate and regular rhythm.      Pulses: Normal pulses.      Heart sounds: Normal heart sounds. No murmur heard.  Pulmonary:      Effort: Pulmonary effort is " normal. No respiratory distress.      Breath sounds: Normal breath sounds. No wheezing.   Abdominal:      General: There is no distension.      Palpations: Abdomen is soft. There is no mass.      Tenderness: There is no abdominal tenderness. There is no guarding or rebound.      Hernia: No hernia is present.   Musculoskeletal:         General: Normal range of motion.      Cervical back: Normal range of motion and neck supple.      Right lower leg: No edema.      Left lower leg: No edema.   Skin:     General: Skin is warm and dry.      Capillary Refill: Capillary refill takes less than 2 seconds.   Neurological:      Mental Status: She is alert and oriented to person, place, and time. Mental status is at baseline.      Motor: No weakness.      Gait: Gait normal.   Psychiatric:         Mood and Affect: Mood normal.         Behavior: Behavior normal.         Thought Content: Thought content normal.         Judgment: Judgment normal.

## 2025-06-03 ENCOUNTER — OFFICE VISIT (OUTPATIENT)
Dept: FAMILY MEDICINE CLINIC | Facility: CLINIC | Age: 50
End: 2025-06-03
Payer: COMMERCIAL

## 2025-06-03 VITALS
BODY MASS INDEX: 28.41 KG/M2 | HEIGHT: 65 IN | DIASTOLIC BLOOD PRESSURE: 74 MMHG | TEMPERATURE: 97.5 F | SYSTOLIC BLOOD PRESSURE: 126 MMHG | OXYGEN SATURATION: 97 % | RESPIRATION RATE: 15 BRPM | WEIGHT: 170.5 LBS | HEART RATE: 73 BPM

## 2025-06-03 DIAGNOSIS — C50.912 MALIGNANT NEOPLASM OF BOTH BREASTS IN FEMALE, ESTROGEN RECEPTOR NEGATIVE, UNSPECIFIED SITE OF BREAST (HCC): ICD-10-CM

## 2025-06-03 DIAGNOSIS — F43.21 ADJUSTMENT DISORDER WITH DEPRESSED MOOD: Primary | ICD-10-CM

## 2025-06-03 DIAGNOSIS — C50.911 MALIGNANT NEOPLASM OF BOTH BREASTS IN FEMALE, ESTROGEN RECEPTOR NEGATIVE, UNSPECIFIED SITE OF BREAST (HCC): ICD-10-CM

## 2025-06-03 DIAGNOSIS — Z17.1 MALIGNANT NEOPLASM OF BOTH BREASTS IN FEMALE, ESTROGEN RECEPTOR NEGATIVE, UNSPECIFIED SITE OF BREAST (HCC): ICD-10-CM

## 2025-06-03 PROCEDURE — 99214 OFFICE O/P EST MOD 30 MIN: CPT | Performed by: NURSE PRACTITIONER

## 2025-06-03 RX ORDER — PAROXETINE 10 MG/1
10 TABLET, FILM COATED ORAL DAILY
Qty: 30 TABLET | Refills: 1 | Status: SHIPPED | OUTPATIENT
Start: 2025-06-03 | End: 2025-06-09

## 2025-06-03 NOTE — PROGRESS NOTES
"Name: Glenna Doyle      : 1975      MRN: [id number]  Encounter Provider: YONI Adamson  Encounter Date: 6/3/2025   Encounter department: [facility]  :  Assessment & Plan  Adjustment disorder with depressed mood    Orders:    PARoxetine (PAXIL) 10 mg tablet; Take 1 tablet (10 mg total) by mouth daily    Pt will start Paxil 10 mg daily. Medication and s/e. Pt to f/u with office in 6 weeks for recheck of her depression or sooner PRN. Patient is encouraged to call our office for any questions/concerns, persistent or worsening symptoms. Patient states they understand and agree with treatment plan.   Malignant neoplasm of both breasts in female, estrogen receptor negative, unspecified site of breast (HCC)       Hx of jay mastectomy.        Pt to f/u in 6 weeks for recheck or sooner PRN.        Depression Screening and Follow-up Plan: Patient was screened for depression during today's encounter. They screened negative with a PHQ-2 score of 2.        History of Present Illness   Pt presents today for increased depression.  She has some life stressor going on that have been making her feel down.  She notes she is crying about things that typically would not bother her.  Pt was on Paxil in the past when she was going through breast CA treatments.  Pt also sees a therapist.      Review of Systems  As noted per HPI.  Objective   /74   Pulse 73   Temp 97.5 °F (36.4 °C)   Resp 15   Ht 5' 5.25\" (1.657 m)   Wt 77.3 kg (170 lb 8 oz)   LMP 2018   SpO2 97%   BMI 28.16 kg/m²      Physical Exam  Vitals reviewed.   Constitutional:       Appearance: Normal appearance.   Pulmonary:      Effort: Pulmonary effort is normal.     Neurological:      Mental Status: She is alert.     Psychiatric:         Mood and Affect: Mood is depressed. Affect is tearful (at times).         Thought Content: Thought content does not include homicidal or suicidal ideation. " Thought content does not include homicidal or suicidal plan.

## 2025-06-09 ENCOUNTER — TELEPHONE (OUTPATIENT)
Age: 50
End: 2025-06-09

## 2025-06-09 DIAGNOSIS — F43.21 ADJUSTMENT DISORDER WITH DEPRESSED MOOD: Primary | ICD-10-CM

## 2025-06-09 NOTE — TELEPHONE ENCOUNTER
Spoke with patient, informed her of Jeniffer's recommendations. Patient understood if there is any questions or concerns to contact the office.

## 2025-06-09 NOTE — TELEPHONE ENCOUNTER
"Attempted to reach out to patient to inform of Jeniffer's recommendations. When reaching out, both times it would say \"make sure to be connected to internet connection.\" And the second time it stated \"the person is busy.\" Will re-try after lunch.   "

## 2025-06-09 NOTE — TELEPHONE ENCOUNTER
Spoke with patient informed her to stop taking the Paxil and  new medication at her pharmacy. Patient understood and had no further questions or concerns.

## 2025-06-09 NOTE — TELEPHONE ENCOUNTER
Patient was seen on 6/3 she is having adverse effect for Paxil. It is making her extremely angry. Please call to advise and prescribe alternate medication if appropriate.    Thank You!!

## 2025-06-09 NOTE — TELEPHONE ENCOUNTER
Patient call again about the way the medication is making her feel and she did stop the medication and will be starting the other medication. She has a question about if there is a way to would help her kick off the medication out her system or if she should start the other medication right away because she does not like the way it make her feel angry toward her . Please reach out tot he patient. thank you

## 2025-07-02 DIAGNOSIS — F43.21 ADJUSTMENT DISORDER WITH DEPRESSED MOOD: ICD-10-CM

## (undated) DEVICE — TIP COVER ACCESSORY

## (undated) DEVICE — FENESTRATED BIPOLAR FORCEPS: Brand: ENDOWRIST

## (undated) DEVICE — INTENDED FOR TISSUE SEPARATION, AND OTHER PROCEDURES THAT REQUIRE A SHARP SURGICAL BLADE TO PUNCTURE OR CUT.: Brand: BARD-PARKER SAFETY BLADES SIZE 15, STERILE

## (undated) DEVICE — MONOPOLAR CURVED SCISSORS: Brand: ENDOWRIST

## (undated) DEVICE — 3000CC GUARDIAN II: Brand: GUARDIAN

## (undated) DEVICE — ANTIBACTERIAL VIOLET BRAIDED (POLYGLACTIN 910), SYNTHETIC ABSORBABLE SUTURE: Brand: COATED VICRYL

## (undated) DEVICE — COLUMN DRAPE

## (undated) DEVICE — INTENDED FOR TISSUE SEPARATION, AND OTHER PROCEDURES THAT REQUIRE A SHARP SURGICAL BLADE TO PUNCTURE OR CUT.: Brand: BARD-PARKER SAFETY BLADES SIZE 11, STERILE

## (undated) DEVICE — ADHESIVE SKIN HIGH VISCOSITY EXOFIN 1ML

## (undated) DEVICE — STERILE CYSTO PACK: Brand: CARDINAL HEALTH

## (undated) DEVICE — LUBRICANT INST ELECTROLUBE ANTISTK WO PAD

## (undated) DEVICE — CHLORAPREP HI-LITE 26ML ORANGE

## (undated) DEVICE — LAPAROSCOPIC TROCAR SLEEVE/SINGLE USE: Brand: KII® OPTICAL ACCESS SYSTEM

## (undated) DEVICE — UTERINE MANIPULATOR RUMI DISP TIP 6.7X12CM ORANGE

## (undated) DEVICE — ARM DRAPE

## (undated) DEVICE — CANNULA SEAL

## (undated) DEVICE — KIT, BETHLEHEM ROBOTIC PROST: Brand: CARDINAL HEALTH

## (undated) DEVICE — SYRINGE 50ML LL

## (undated) DEVICE — 1820 FOAM BLOCK NEEDLE COUNTER: Brand: DEVON

## (undated) DEVICE — VISUALIZATION SYSTEM: Brand: CLEARIFY

## (undated) DEVICE — MAYO STAND COVER: Brand: CONVERTORS

## (undated) DEVICE — PREMIUM DRY TRAY LF: Brand: MEDLINE INDUSTRIES, INC.

## (undated) DEVICE — PROGRASP FORCEPS: Brand: ENDOWRIST

## (undated) DEVICE — BLADELESS OBTURATOR: Brand: WECK VISTA

## (undated) DEVICE — TROCAR PORT ACCESS 12 X120MM W/BLDLS OPTICAL TIP AIRSEAL

## (undated) DEVICE — VESSEL SEALER: Brand: ENDOWRIST;DAVINCI SI

## (undated) DEVICE — SPECIMEN TRAP: Brand: ARGYLE

## (undated) DEVICE — SUT MONOCRYL 4-0 PS-2 27 IN Y426H

## (undated) DEVICE — SUT STRATAFIX SPIRAL 2-0 CT-1 30 CM SXPP1B410

## (undated) DEVICE — AIRSEAL TUBE SMOKE EVAC LUMENX3 FILTERED

## (undated) DEVICE — OCCLUDER COLPO-PNEUMO

## (undated) DEVICE — Device: Brand: TISSUE RETRIEVAL SYSTEM

## (undated) DEVICE — LARGE NEEDLE DRIVER: Brand: ENDOWRIST

## (undated) DEVICE — CHLORHEXIDINE 4PCT 4 OZ

## (undated) DEVICE — GLOVE INDICATOR PI UNDERGLOVE SZ 7 BLUE

## (undated) DEVICE — TRAY FOLEY 16FR URIMETER SILICONE SURESTEP

## (undated) DEVICE — GLOVE PI ULTRA TOUCH SZ.7.5

## (undated) DEVICE — GLOVE INDICATOR PI UNDERGLOVE SZ 8 BLUE